# Patient Record
Sex: FEMALE | Race: WHITE | Employment: OTHER | URBAN - METROPOLITAN AREA
[De-identification: names, ages, dates, MRNs, and addresses within clinical notes are randomized per-mention and may not be internally consistent; named-entity substitution may affect disease eponyms.]

---

## 2020-09-12 ENCOUNTER — HOSPITAL ENCOUNTER (EMERGENCY)
Facility: HOSPITAL | Age: 85
Discharge: HOME | End: 2020-09-12
Attending: EMERGENCY MEDICINE
Payer: MEDICARE

## 2020-09-12 VITALS
DIASTOLIC BLOOD PRESSURE: 76 MMHG | HEIGHT: 60 IN | RESPIRATION RATE: 16 BRPM | HEART RATE: 62 BPM | OXYGEN SATURATION: 98 % | WEIGHT: 180 LBS | TEMPERATURE: 98 F | SYSTOLIC BLOOD PRESSURE: 137 MMHG | BODY MASS INDEX: 35.34 KG/M2

## 2020-09-12 DIAGNOSIS — S90.424A BLISTER OF TOE OF RIGHT FOOT WITHOUT INFECTION, INITIAL ENCOUNTER: Primary | ICD-10-CM

## 2020-09-12 PROCEDURE — 99283 EMERGENCY DEPT VISIT LOW MDM: CPT

## 2020-09-12 NOTE — ED INITIAL ASSESSMENT (HPI)
Pt came in for sore to second toe of the right foot that pt's daughter noticed today. Pt is concerned because she has history of Diabetes. Denies fever.

## 2020-09-13 NOTE — ED PROVIDER NOTES
Patient Seen in: [facility] Emergency Department    History   Patient presents with:  Musculoskeletal Problem      HPI    Patient presents to the ED with family concerned about a sore to her right second toe.   History of diabetes and has mild neur Constitutional: She is oriented to person, place, and time. She appears well-developed and well-nourished. No distress. HENT:   Head: Normocephalic and atraumatic. Cardiovascular: Normal rate and intact distal pulses.    Pulmonary/Chest: Effort normal Clinical Impression:  Blister of toe of right foot without infection, initial encounter  (primary encounter diagnosis)    Disposition:  Discharge    Follow-up:  [facility] Emergency Department  [address]   [address]

## 2023-02-21 ENCOUNTER — APPOINTMENT (OUTPATIENT)
Dept: GENERAL RADIOLOGY | Facility: HOSPITAL | Age: 88
End: 2023-02-21
Attending: EMERGENCY MEDICINE
Payer: MEDICARE

## 2023-02-21 ENCOUNTER — APPOINTMENT (OUTPATIENT)
Dept: MRI IMAGING | Facility: HOSPITAL | Age: 88
End: 2023-02-21
Attending: EMERGENCY MEDICINE
Payer: MEDICARE

## 2023-02-21 ENCOUNTER — APPOINTMENT (OUTPATIENT)
Dept: CT IMAGING | Facility: HOSPITAL | Age: 88
End: 2023-02-21
Attending: EMERGENCY MEDICINE
Payer: MEDICARE

## 2023-02-21 ENCOUNTER — APPOINTMENT (OUTPATIENT)
Dept: ULTRASOUND IMAGING | Facility: HOSPITAL | Age: 88
End: 2023-02-21
Attending: HOSPITALIST
Payer: MEDICARE

## 2023-02-21 ENCOUNTER — HOSPITAL ENCOUNTER (OUTPATIENT)
Facility: HOSPITAL | Age: 88
Setting detail: OBSERVATION
Discharge: HOME HEALTH | End: 2023-02-23
Attending: EMERGENCY MEDICINE | Admitting: HOSPITALIST
Payer: MEDICARE

## 2023-02-21 ENCOUNTER — APPOINTMENT (OUTPATIENT)
Dept: CV DIAGNOSTICS | Facility: HOSPITAL | Age: 88
End: 2023-02-21
Attending: INTERNAL MEDICINE
Payer: MEDICARE

## 2023-02-21 DIAGNOSIS — R55 SYNCOPE AND COLLAPSE: Primary | ICD-10-CM

## 2023-02-21 DIAGNOSIS — R42 VERTIGO: ICD-10-CM

## 2023-02-21 DIAGNOSIS — S09.90XA INJURY OF HEAD, INITIAL ENCOUNTER: ICD-10-CM

## 2023-02-21 PROBLEM — E87.6 HYPOKALEMIA: Status: ACTIVE | Noted: 2023-02-21

## 2023-02-21 PROBLEM — R73.9 HYPERGLYCEMIA: Status: ACTIVE | Noted: 2023-02-21

## 2023-02-21 LAB
ANION GAP SERPL CALC-SCNC: 8 MMOL/L (ref 0–18)
ATRIAL RATE: 61 BPM
BASOPHILS # BLD AUTO: 0.09 X10(3) UL (ref 0–0.2)
BASOPHILS NFR BLD AUTO: 0.9 %
BILIRUB UR QL: NEGATIVE
BUN BLD-MCNC: 17 MG/DL (ref 7–18)
BUN/CREAT SERPL: 18.9 (ref 10–20)
CALCIUM BLD-MCNC: 9.6 MG/DL (ref 8.5–10.1)
CHLORIDE SERPL-SCNC: 105 MMOL/L (ref 98–112)
CLARITY UR: CLEAR
CO2 SERPL-SCNC: 30 MMOL/L (ref 21–32)
COLOR UR: YELLOW
CREAT BLD-MCNC: 0.9 MG/DL
DEPRECATED RDW RBC AUTO: 43.6 FL (ref 35.1–46.3)
EOSINOPHIL # BLD AUTO: 0.32 X10(3) UL (ref 0–0.7)
EOSINOPHIL NFR BLD AUTO: 3.1 %
ERYTHROCYTE [DISTWIDTH] IN BLOOD BY AUTOMATED COUNT: 13.3 % (ref 11–15)
EST. AVERAGE GLUCOSE BLD GHB EST-MCNC: 126 MG/DL (ref 68–126)
GFR SERPLBLD BASED ON 1.73 SQ M-ARVRAT: 61 ML/MIN/1.73M2 (ref 60–?)
GLUCOSE BLD-MCNC: 124 MG/DL (ref 70–99)
GLUCOSE BLDC GLUCOMTR-MCNC: 124 MG/DL (ref 70–99)
GLUCOSE BLDC GLUCOMTR-MCNC: 126 MG/DL (ref 70–99)
GLUCOSE BLDC GLUCOMTR-MCNC: 163 MG/DL (ref 70–99)
GLUCOSE BLDC GLUCOMTR-MCNC: 185 MG/DL (ref 70–99)
GLUCOSE BLDC GLUCOMTR-MCNC: 190 MG/DL (ref 70–99)
GLUCOSE UR-MCNC: NEGATIVE MG/DL
HBA1C MFR BLD: 6 % (ref ?–5.7)
HCT VFR BLD AUTO: 41.3 %
HGB BLD-MCNC: 13.4 G/DL
HGB UR QL STRIP.AUTO: NEGATIVE
IMM GRANULOCYTES # BLD AUTO: 0.05 X10(3) UL (ref 0–1)
IMM GRANULOCYTES NFR BLD: 0.5 %
KETONES UR-MCNC: NEGATIVE MG/DL
LEUKOCYTE ESTERASE UR QL STRIP.AUTO: NEGATIVE
LYMPHOCYTES # BLD AUTO: 2.98 X10(3) UL (ref 1–4)
LYMPHOCYTES NFR BLD AUTO: 29.1 %
MCH RBC QN AUTO: 29.2 PG (ref 26–34)
MCHC RBC AUTO-ENTMCNC: 32.4 G/DL (ref 31–37)
MCV RBC AUTO: 90 FL
MONOCYTES # BLD AUTO: 0.77 X10(3) UL (ref 0.1–1)
MONOCYTES NFR BLD AUTO: 7.5 %
NEUTROPHILS # BLD AUTO: 6.03 X10 (3) UL (ref 1.5–7.7)
NEUTROPHILS # BLD AUTO: 6.03 X10(3) UL (ref 1.5–7.7)
NEUTROPHILS NFR BLD AUTO: 58.9 %
NITRITE UR QL STRIP.AUTO: NEGATIVE
OSMOLALITY SERPL CALC.SUM OF ELEC: 299 MOSM/KG (ref 275–295)
P AXIS: 64 DEGREES
P-R INTERVAL: 170 MS
PH UR: 6 [PH] (ref 5–8)
PLATELET # BLD AUTO: 235 10(3)UL (ref 150–450)
POTASSIUM SERPL-SCNC: 3.4 MMOL/L (ref 3.5–5.1)
PROT UR-MCNC: NEGATIVE MG/DL
Q-T INTERVAL: 428 MS
QRS DURATION: 92 MS
QTC CALCULATION (BEZET): 430 MS
R AXIS: -33 DEGREES
RBC # BLD AUTO: 4.59 X10(6)UL
SARS-COV-2 RNA RESP QL NAA+PROBE: NOT DETECTED
SODIUM SERPL-SCNC: 143 MMOL/L (ref 136–145)
SP GR UR STRIP: 1.01 (ref 1–1.03)
T AXIS: 42 DEGREES
TROPONIN I HIGH SENSITIVITY: 10 NG/L
UROBILINOGEN UR STRIP-ACNC: <2
VENTRICULAR RATE: 61 BPM
VIT C UR-MCNC: NEGATIVE MG/DL
WBC # BLD AUTO: 10.2 X10(3) UL (ref 4–11)

## 2023-02-21 PROCEDURE — 93306 TTE W/DOPPLER COMPLETE: CPT | Performed by: INTERNAL MEDICINE

## 2023-02-21 PROCEDURE — 70551 MRI BRAIN STEM W/O DYE: CPT | Performed by: EMERGENCY MEDICINE

## 2023-02-21 PROCEDURE — 70450 CT HEAD/BRAIN W/O DYE: CPT | Performed by: EMERGENCY MEDICINE

## 2023-02-21 PROCEDURE — 73502 X-RAY EXAM HIP UNI 2-3 VIEWS: CPT | Performed by: EMERGENCY MEDICINE

## 2023-02-21 PROCEDURE — 93880 EXTRACRANIAL BILAT STUDY: CPT | Performed by: HOSPITALIST

## 2023-02-21 PROCEDURE — 99223 1ST HOSP IP/OBS HIGH 75: CPT | Performed by: HOSPITALIST

## 2023-02-21 RX ORDER — METOCLOPRAMIDE HYDROCHLORIDE 5 MG/ML
5 INJECTION INTRAMUSCULAR; INTRAVENOUS EVERY 8 HOURS PRN
Status: DISCONTINUED | OUTPATIENT
Start: 2023-02-21 | End: 2023-02-23

## 2023-02-21 RX ORDER — MECLIZINE HCL 12.5 MG/1
12.5 TABLET ORAL 3 TIMES DAILY PRN
Status: DISCONTINUED | OUTPATIENT
Start: 2023-02-21 | End: 2023-02-23

## 2023-02-21 RX ORDER — SODIUM CHLORIDE 9 MG/ML
INJECTION, SOLUTION INTRAVENOUS CONTINUOUS
Status: DISCONTINUED | OUTPATIENT
Start: 2023-02-21 | End: 2023-02-23

## 2023-02-21 RX ORDER — MECLIZINE HYDROCHLORIDE 25 MG/1
25 TABLET ORAL ONCE
Status: COMPLETED | OUTPATIENT
Start: 2023-02-21 | End: 2023-02-21

## 2023-02-21 RX ORDER — POTASSIUM CHLORIDE 20 MEQ/1
40 TABLET, EXTENDED RELEASE ORAL ONCE
Status: COMPLETED | OUTPATIENT
Start: 2023-02-21 | End: 2023-02-21

## 2023-02-21 RX ORDER — LORAZEPAM 2 MG/ML
INJECTION INTRAMUSCULAR
Status: DISPENSED
Start: 2023-02-21 | End: 2023-02-22

## 2023-02-21 RX ORDER — HYDROCHLOROTHIAZIDE 25 MG/1
25 TABLET ORAL DAILY
COMMUNITY
End: 2023-02-23

## 2023-02-21 RX ORDER — ATORVASTATIN CALCIUM 40 MG/1
40 TABLET, FILM COATED ORAL NIGHTLY
Status: DISCONTINUED | OUTPATIENT
Start: 2023-02-21 | End: 2023-02-23

## 2023-02-21 RX ORDER — ATORVASTATIN CALCIUM 40 MG/1
40 TABLET, FILM COATED ORAL NIGHTLY
COMMUNITY

## 2023-02-21 RX ORDER — ASPIRIN 81 MG/1
81 TABLET ORAL DAILY
Status: DISCONTINUED | OUTPATIENT
Start: 2023-02-21 | End: 2023-02-23

## 2023-02-21 RX ORDER — ONDANSETRON 2 MG/ML
4 INJECTION INTRAMUSCULAR; INTRAVENOUS EVERY 6 HOURS PRN
Status: DISCONTINUED | OUTPATIENT
Start: 2023-02-21 | End: 2023-02-23

## 2023-02-21 RX ORDER — ASPIRIN 81 MG/1
81 TABLET ORAL DAILY
COMMUNITY
End: 2023-02-23

## 2023-02-21 RX ORDER — NICOTINE POLACRILEX 4 MG
15 LOZENGE BUCCAL
Status: DISCONTINUED | OUTPATIENT
Start: 2023-02-21 | End: 2023-02-23

## 2023-02-21 RX ORDER — DEXTROSE MONOHYDRATE 25 G/50ML
50 INJECTION, SOLUTION INTRAVENOUS
Status: DISCONTINUED | OUTPATIENT
Start: 2023-02-21 | End: 2023-02-23

## 2023-02-21 RX ORDER — METOPROLOL SUCCINATE 50 MG/1
50 TABLET, EXTENDED RELEASE ORAL
Status: DISCONTINUED | OUTPATIENT
Start: 2023-02-22 | End: 2023-02-23

## 2023-02-21 RX ORDER — METOPROLOL SUCCINATE 100 MG/1
100 TABLET, EXTENDED RELEASE ORAL DAILY
COMMUNITY
End: 2023-02-23

## 2023-02-21 RX ORDER — POTASSIUM CITRATE 10 MEQ/1
10 TABLET, EXTENDED RELEASE ORAL DAILY
COMMUNITY
End: 2023-02-23

## 2023-02-21 RX ORDER — ACETAMINOPHEN 500 MG
500 TABLET ORAL EVERY 4 HOURS PRN
Status: DISCONTINUED | OUTPATIENT
Start: 2023-02-21 | End: 2023-02-23

## 2023-02-21 RX ORDER — NICOTINE POLACRILEX 4 MG
30 LOZENGE BUCCAL
Status: DISCONTINUED | OUTPATIENT
Start: 2023-02-21 | End: 2023-02-23

## 2023-02-21 NOTE — CM/SW NOTE
02/21/23 1400   CM/SW Referral Data   Referral Source Physician   Reason for Referral Discharge planning   Informant Patient   Access to Care / Medical Hx   Do you have a doctor or clinic where you usually go for medical care (including prenatal care)? Yes  (Dr. Mickey Mullen)   Patient Info   Advanced directives? Yes  (Dtr Riddhi)   Patient's Current Mental Status at Time of Assessment Alert;Oriented   Patient's [address]   Number of Levels in Home 2  (Pt's bedroom on the 1st level)   Number of Stair in Home 1 external step   Patient lives with Daughter  Carter Cee)   Patient Status Prior to Admission   Independent with ADLs and Mobility No   Pt. requires assistance with Ambulating;Dressing; Toileting   Services in place prior to admission [address] [facility];DME/Supplies at home   [address] [place] [address]   [facility] D[facility]Supplies [facility]; [address] [facility]   Discharge Needs   Anticipated D/C needs Home health care   Choice of Post-Acute Provider   Informed patient of right to choose their preferred provider Yes   SW intern met with pt and pt's dtr's at bedside to complete above assessment. Pt confirmed address on face sheet as correct. Pt's dtr Riddhi stated that she helps the pt dressing. Pt stated that she walks with a walker at baseline. Pt and pt's dtr confirmed to be current with [facility] services. Pt's dtr Riddhi stated that the [facility] agency is [employer]. Pt receives RN once a week and had PT 2 times a week then once a week and then it was completed. Pt does not drive dtr Riddhi stated that the pt is legally blind. SW intern left a message on [employer] voicemail at [phone] to let her know that the POA paperwork is not on file. Pt denied wearing O2 at home. Pt denied dialysis. Pt is agreeable to [facility]. PLAN: Home with Joe Powell. Pending PT/OT rec? Need ZULY.     Social Work Intern  [employer]

## 2023-02-21 NOTE — H&P
[facility]    PATIENT'S NAME: Maite Irene PHYSICIAN: Lorie Groves MD   PATIENT ACCOUNT#:   [id number]    LOCATION:  1W EDOF54 [facility]  MEDICAL RECORD #:   [id number]       YOB: 1932  ADMISSION DATE:       02/21/2023    HISTORY AND PHYSICAL EXAMINATION    CHIEF COMPLAINT:  Syncope and presyncope. HISTORY OF PRESENT ILLNESS:  The patient is a 80-year-old  female who was brought in today after she had multiple episodes at home of presyncope to syncopal episodes in the last 10 days. CBC and chemistry were unremarkable. Potassium 3.4. EKG showed sinus rhythm, rate mid 60s. MRI scan of the brain and x-ray of left hip were unremarkable. The patient will be admitted to the hospital for further observation. PAST MEDICAL HISTORY:  Paroxysmal atrial fibrillation diagnosed December 2022 currently anticoagulated with Eliquis, hypertension, hyperlipidemia, cerebrovascular accident, diabetes mellitus type 2 non-insulin-dependent. Echocardiogram done in December 2022 showed left ventricular diastolic dysfunction, severely dilated left atrium. PAST SURGICAL HISTORY:  Bilateral total hip arthroplasties and remote cholecystectomy. MEDICATIONS:  Please see medication reconciliation list.    ALLERGIES:  Latex and sulfa antibiotics. FAMILY HISTORY:  Positive for hypertension and diabetes. REVIEW OF SYSTEMS:  The patient said 2 days ago she was standing in the bathroom using her walker when her left leg started wiggling and felt very weak and she collapsed to the floor. Did not hit her head. Another episode 1 week prior to that when she briefly lost consciousness while standing in an upright position. She usually feels very lightheaded when she stands up quickly. She had poor appetite and she had lost around 10 pounds in the last 2 weeks. The patient was placed back on hydrochlorothiazide by a home visiting physician because of crackles and rhonchi in her lungs. At that time she was having cough and viral infection. Since then, she has been feeling more lightheaded when she stands up. She was taken off hydrochlorothiazide in December 2022 during hospitalization for new diagnosis of atrial fibrillation. After that, she was on a cardiac monitor for 16 days which showed no incidence of syncope or presyncope. Her hydrochlorothiazide was discontinued during that hospitalization. Other 12-point review of systems negative. No chest pain, no cough, no abdominal pain, no diarrhea. PHYSICAL EXAMINATION:    GENERAL:  Alert. Oriented to time, place, and person. No acute distress. VITAL SIGNS:  Temperature 97.3, pulse 71, respiratory rate 19, blood pressure 111/69, pulse ox 97% on room air. HEENT:  Atraumatic. Oropharynx clear, dry mucous membranes. Normal hard and soft palate. Eyes:  Anicteric sclerae. NECK:  Supple. No lymphadenopathy. Trachea midline. Full range of motion. LUNGS:  Clear to auscultation bilaterally. Normal respiratory effort. HEART:  Regular rate and rhythm. S1, S2 auscultated. No murmur. ABDOMEN:  Soft, nondistended, no tenderness. Positive bowel sounds. EXTREMITIES:  No peripheral edema, clubbing, or cyanosis. NEUROLOGIC:  Motor and sensory intact. ASSESSMENT:    1. Syncope and presyncope, most likely orthostatic in nature. 2.   History of atrial fibrillation. 3.   Diabetes mellitus type 2. PLAN:  The patient will be admitted to telemetry floor for observation. Obtain carotid ultrasound. She did have a recent echocardiogram done noted on record. Obtain cardiology consult, obtain orthostatic measurements, discontinue hydrochlorothiazide, gently hydrate, place her on fall precautions and physical therapy. Further recommendations to follow.     Dictated By Mai Prater MD  d: 02/21/2023 12:34:54  t: 02/21/2023 12:40:16  Job [id number]  FB/

## 2023-02-21 NOTE — PLAN OF CARE
RA. Afebrile. Patient still complaining of dizziness at times. Bedrest maintained. Zoey Mule in place with good output. US carotid negative. ECHO to be completed. Orthostatics qshift. Daughter updated at bedside. Problem: CARDIOVASCULAR - ADULT  Goal: Maintains optimal cardiac output and hemodynamic stability  Description: INTERVENTIONS:  - Monitor vital signs, rhythm, and trends  - Monitor for bleeding, hypotension and signs of decreased cardiac output  - Evaluate effectiveness of vasoactive medications to optimize hemodynamic stability  - Monitor arterial and/or venous puncture sites for bleeding and/or hematoma  - Assess quality of pulses, skin color and temperature  - Assess for signs of decreased coronary artery perfusion - ex.  Angina  - Evaluate fluid balance, assess for edema, trend weights  Outcome: Progressing  Goal: Absence of cardiac arrhythmias or at baseline  Description: INTERVENTIONS:  - Continuous cardiac monitoring, monitor vital signs, obtain 12 lead EKG if indicated  - Evaluate effectiveness of antiarrhythmic and heart rate control medications as ordered  - Initiate emergency measures for life threatening arrhythmias  - Monitor electrolytes and administer replacement therapy as ordered  Outcome: Progressing

## 2023-02-21 NOTE — ED INITIAL ASSESSMENT (HPI)
Roberto Coffee on Sunday d/t \"knee giving out\" and hit head on door. No LOC. On blood thinners. Today woke up and sat up from laying down she felt lightheaded and dizzy. Currently says she only feels lightheaded when she moved her head. No other complaints.

## 2023-02-21 NOTE — PROGRESS NOTES
This RN called report to Rafaela Tuttle RN. Daughter aware and agreeable of transfer. Skin intact with no wounds.

## 2023-02-22 LAB
ANION GAP SERPL CALC-SCNC: 7 MMOL/L (ref 0–18)
BASOPHILS # BLD AUTO: 0.06 X10(3) UL (ref 0–0.2)
BASOPHILS NFR BLD AUTO: 0.6 %
BUN BLD-MCNC: 16 MG/DL (ref 7–18)
BUN/CREAT SERPL: 18 (ref 10–20)
CALCIUM BLD-MCNC: 8.9 MG/DL (ref 8.5–10.1)
CHLORIDE SERPL-SCNC: 111 MMOL/L (ref 98–112)
CO2 SERPL-SCNC: 26 MMOL/L (ref 21–32)
CREAT BLD-MCNC: 0.89 MG/DL
DEPRECATED RDW RBC AUTO: 42.7 FL (ref 35.1–46.3)
EOSINOPHIL # BLD AUTO: 0.24 X10(3) UL (ref 0–0.7)
EOSINOPHIL NFR BLD AUTO: 2.6 %
ERYTHROCYTE [DISTWIDTH] IN BLOOD BY AUTOMATED COUNT: 13.1 % (ref 11–15)
GFR SERPLBLD BASED ON 1.73 SQ M-ARVRAT: 62 ML/MIN/1.73M2 (ref 60–?)
GLUCOSE BLD-MCNC: 134 MG/DL (ref 70–99)
GLUCOSE BLDC GLUCOMTR-MCNC: 122 MG/DL (ref 70–99)
GLUCOSE BLDC GLUCOMTR-MCNC: 142 MG/DL (ref 70–99)
GLUCOSE BLDC GLUCOMTR-MCNC: 159 MG/DL (ref 70–99)
GLUCOSE BLDC GLUCOMTR-MCNC: 89 MG/DL (ref 70–99)
HCT VFR BLD AUTO: 36.3 %
HGB BLD-MCNC: 11.9 G/DL
IMM GRANULOCYTES # BLD AUTO: 0.03 X10(3) UL (ref 0–1)
IMM GRANULOCYTES NFR BLD: 0.3 %
LYMPHOCYTES # BLD AUTO: 2.13 X10(3) UL (ref 1–4)
LYMPHOCYTES NFR BLD AUTO: 23 %
MCH RBC QN AUTO: 29.6 PG (ref 26–34)
MCHC RBC AUTO-ENTMCNC: 32.8 G/DL (ref 31–37)
MCV RBC AUTO: 90.3 FL
MONOCYTES # BLD AUTO: 0.7 X10(3) UL (ref 0.1–1)
MONOCYTES NFR BLD AUTO: 7.6 %
NEUTROPHILS # BLD AUTO: 6.1 X10 (3) UL (ref 1.5–7.7)
NEUTROPHILS # BLD AUTO: 6.1 X10(3) UL (ref 1.5–7.7)
NEUTROPHILS NFR BLD AUTO: 65.9 %
OSMOLALITY SERPL CALC.SUM OF ELEC: 301 MOSM/KG (ref 275–295)
PLATELET # BLD AUTO: 198 10(3)UL (ref 150–450)
POTASSIUM SERPL-SCNC: 3.7 MMOL/L (ref 3.5–5.1)
POTASSIUM SERPL-SCNC: 3.7 MMOL/L (ref 3.5–5.1)
RBC # BLD AUTO: 4.02 X10(6)UL
SODIUM SERPL-SCNC: 144 MMOL/L (ref 136–145)
WBC # BLD AUTO: 9.3 X10(3) UL (ref 4–11)

## 2023-02-22 PROCEDURE — 99233 SBSQ HOSP IP/OBS HIGH 50: CPT | Performed by: HOSPITALIST

## 2023-02-22 RX ORDER — AMOXICILLIN AND CLAVULANATE POTASSIUM 875; 125 MG/1; MG/1
875 TABLET, FILM COATED ORAL EVERY 12 HOURS SCHEDULED
Status: DISCONTINUED | OUTPATIENT
Start: 2023-02-22 | End: 2023-02-23

## 2023-02-22 RX ORDER — ECHINACEA PURPUREA EXTRACT 125 MG
1 TABLET ORAL EVERY 6 HOURS
Status: DISCONTINUED | OUTPATIENT
Start: 2023-02-22 | End: 2023-02-23

## 2023-02-22 NOTE — PLAN OF CARE
Problem: Patient Centered Care  Goal: Patient preferences are identified and integrated in the patient's plan of care  Description: Interventions:  - What would you like us to know as we care for you? Patient is from home with daughter   - Provide timely, complete, and accurate information to patient/family  - Incorporate patient and family knowledge, values, beliefs, and cultural backgrounds into the planning and delivery of care  - Encourage patient/family to participate in care and decision-making at the level they choose  - Honor patient and family perspectives and choices  Outcome: Progressing     Problem: Patient/Family Goals  Goal: Patient/Family Long Term Goal  Description: Patient's Long Term Goal: to go home    Interventions:  - See additional Care Plan goals for specific interventions  Outcome: Progressing  Goal: Patient/Family Short Term Goal  Description: Patient's Short Term Goal: to get rid of this vertigo     Interventions:   - See additional Care Plan goals for specific interventions  Outcome: Progressing     Problem: CARDIOVASCULAR - ADULT  Goal: Maintains optimal cardiac output and hemodynamic stability  Description: INTERVENTIONS:  - Monitor vital signs, rhythm, and trends  - Monitor for bleeding, hypotension and signs of decreased cardiac output  - Evaluate effectiveness of vasoactive medications to optimize hemodynamic stability  - Monitor arterial and/or venous puncture sites for bleeding and/or hematoma  - Assess quality of pulses, skin color and temperature  - Assess for signs of decreased coronary artery perfusion - ex.  Angina  - Evaluate fluid balance, assess for edema, trend weights  Outcome: Progressing  Goal: Absence of cardiac arrhythmias or at baseline  Description: INTERVENTIONS:  - Continuous cardiac monitoring, monitor vital signs, obtain 12 lead EKG if indicated  - Evaluate effectiveness of antiarrhythmic and heart rate control medications as ordered  - Initiate emergency measures for life threatening arrhythmias  - Monitor electrolytes and administer replacement therapy as ordered  Outcome: Progressing   Patient states her vertigo is improved. VSS. Orthostatics q shift. Echo done yesterday, results pending. Fall precautions in place. Bed in lowest locked position with side rails up and call light within reach. Will continue to monitor patient.

## 2023-02-22 NOTE — CONSULTS
[facility]    PATIENT'S NAME: Gertrudis Zarco   ATTENDING PHYSICIAN: Netta Monroy DO   CONSULTING PHYSICIAN: Roberto Harrison DO   PATIENT ACCOUNT#:   [de-identified]    LOCATION:  [address] RECORD #:   [id number]       YOB: 1932  ADMISSION DATE:       02/21/2023      CONSULT DATE:  02/22/2023    REPORT OF CONSULTATION      HISTORY OF PRESENT ILLNESS:  This is a 59-year-old female who comes in with dizziness and inability to stand. No chest pain or heaviness in the chest at rest or with activity. She had multiple episodes of presyncope. No issues with telemetry overnight. She had MRI of the brain and x-ray of the left hip which were unremarkable. She states that she feels orthostatic. PAST MEDICAL HISTORY:  Significant for paroxysmal atrial fibrillation, hypertension, hyperlipidemia, CVA, type 2 diabetes, severely dilated left atrium by echocardiogram.    MEDICATIONS:  Medication list reviewed. ALLERGIES:  Latex and sulfa. SOCIAL HISTORY:  Nonsmoker. No alcohol use. REVIEW OF SYSTEMS:  Otherwise negative. As stated, the patient did complain of orthostatic symptoms. The patient has been stopped on hydrochlorothiazide. PHYSICAL EXAMINATION:    GENERAL:  Obese, no acute distress. VITAL SIGNS:  Blood pressure 147/93, pulse 76. HEENT:  Head is atraumatic. No scleral icterus. Oral mucosa is moist.  NECK:  Neck veins are not seen. No carotid bruits. LUNGS:  Clear. HEART:  S1, S2.  Regular. No murmurs. ABDOMEN:  Soft, nontender. EXTREMITIES:  Without edema. NEUROLOGIC:  Alert, appropriate. PSYCHIATRIC:  The patient has calm affect and appropriate mood. IMPRESSION:    1. Syncope and presyncope. Appears to be orthostatic by symptoms. 2.   Paroxysmal atrial fibrillation. Currently in sinus rhythm overnight on the monitor. No blocks in the monitor as well. EKG demonstrates sinus rhythm with left axis deviation.     RECOMMENDATIONS:  Orthostatic vitals to be obtained today. The patient had gentle hydration overnight. Continue with Eliquis, metoprolol. Hydrochlorothiazide has been stopped. Thank you for the consult.     Dictated By Jamel Collazo DO  d: 02/22/2023 09:25:18  t: 02/22/2023 09:30:01  [facility] [id number]  AS/

## 2023-02-22 NOTE — PLAN OF CARE
Problem: CARDIOVASCULAR - ADULT  Goal: Maintains optimal cardiac output and hemodynamic stability  Description: INTERVENTIONS:  - Monitor vital signs, rhythm, and trends  - Monitor for bleeding, hypotension and signs of decreased cardiac output  - Evaluate effectiveness of vasoactive medications to optimize hemodynamic stability  - Monitor arterial and/or venous puncture sites for bleeding and/or hematoma  - Assess quality of pulses, skin color and temperature  - Assess for signs of decreased coronary artery perfusion - ex. Angina  - Evaluate fluid balance, assess for edema, trend weights  Outcome: Progressing  Goal: Absence of cardiac arrhythmias or at baseline  Description: INTERVENTIONS:  - Continuous cardiac monitoring, monitor vital signs, obtain 12 lead EKG if indicated  - Evaluate effectiveness of antiarrhythmic and heart rate control medications as ordered  - Initiate emergency measures for life threatening arrhythmias  - Monitor electrolytes and administer replacement therapy as ordered  Outcome: Progressing   No complaint of chest pain or shortness of breath. Noted to have dizziness with movements, patient stated \" not that much \". Alert and oriented. VSS. Oriented to room and call light. Fall risk. Bed on low and locked position, call light within reach.

## 2023-02-22 NOTE — PROGRESS NOTES
02/22/23 1243   Clinical Encounter Type   Visited With Patient and family together   Routine Visit Introduction   Continue Visiting Yes   Family Spiritual Encounters   Family Participation in [address] During Treatment Consistently   Taxonomy   Intended Effects Angy affirmation   Methods Offer spiritual/Congregational support   Interventions Prayer for healing       Pt desired [place] daughter was at the bedside.  was able to pray for the Pt and her daughter and informed them of  availability.      Christopher Reveles, Chaplain Resident

## 2023-02-23 VITALS
WEIGHT: 168 LBS | OXYGEN SATURATION: 96 % | HEART RATE: 78 BPM | HEIGHT: 61 IN | DIASTOLIC BLOOD PRESSURE: 88 MMHG | SYSTOLIC BLOOD PRESSURE: 156 MMHG | TEMPERATURE: 99 F | BODY MASS INDEX: 31.72 KG/M2 | RESPIRATION RATE: 20 BRPM

## 2023-02-23 LAB
ANION GAP SERPL CALC-SCNC: 5 MMOL/L (ref 0–18)
BUN BLD-MCNC: 13 MG/DL (ref 7–18)
BUN/CREAT SERPL: 17.3 (ref 10–20)
CALCIUM BLD-MCNC: 8.5 MG/DL (ref 8.5–10.1)
CHLORIDE SERPL-SCNC: 114 MMOL/L (ref 98–112)
CO2 SERPL-SCNC: 25 MMOL/L (ref 21–32)
CREAT BLD-MCNC: 0.75 MG/DL
DEPRECATED RDW RBC AUTO: 44.4 FL (ref 35.1–46.3)
ERYTHROCYTE [DISTWIDTH] IN BLOOD BY AUTOMATED COUNT: 13.2 % (ref 11–15)
GFR SERPLBLD BASED ON 1.73 SQ M-ARVRAT: 76 ML/MIN/1.73M2 (ref 60–?)
GLUCOSE BLD-MCNC: 119 MG/DL (ref 70–99)
GLUCOSE BLDC GLUCOMTR-MCNC: 110 MG/DL (ref 70–99)
GLUCOSE BLDC GLUCOMTR-MCNC: 142 MG/DL (ref 70–99)
GLUCOSE BLDC GLUCOMTR-MCNC: 221 MG/DL (ref 70–99)
HCT VFR BLD AUTO: 33 %
HGB BLD-MCNC: 10.7 G/DL
MCH RBC QN AUTO: 29.6 PG (ref 26–34)
MCHC RBC AUTO-ENTMCNC: 32.4 G/DL (ref 31–37)
MCV RBC AUTO: 91.4 FL
OSMOLALITY SERPL CALC.SUM OF ELEC: 299 MOSM/KG (ref 275–295)
PLATELET # BLD AUTO: 173 10(3)UL (ref 150–450)
POTASSIUM SERPL-SCNC: 3.5 MMOL/L (ref 3.5–5.1)
RBC # BLD AUTO: 3.61 X10(6)UL
SODIUM SERPL-SCNC: 144 MMOL/L (ref 136–145)
WBC # BLD AUTO: 7.6 X10(3) UL (ref 4–11)

## 2023-02-23 PROCEDURE — 99239 HOSP IP/OBS DSCHRG MGMT >30: CPT | Performed by: HOSPITALIST

## 2023-02-23 RX ORDER — MECLIZINE HCL 12.5 MG/1
12.5 TABLET ORAL 3 TIMES DAILY PRN
Qty: 20 TABLET | Refills: 0 | Status: SHIPPED | OUTPATIENT
Start: 2023-02-23

## 2023-02-23 RX ORDER — METOPROLOL SUCCINATE 50 MG/1
50 TABLET, EXTENDED RELEASE ORAL
Qty: 30 TABLET | Refills: 0 | Status: SHIPPED | OUTPATIENT
Start: 2023-02-24

## 2023-02-23 RX ORDER — AMOXICILLIN AND CLAVULANATE POTASSIUM 875; 125 MG/1; MG/1
875 TABLET, FILM COATED ORAL EVERY 12 HOURS SCHEDULED
Qty: 18 TABLET | Refills: 0 | Status: SHIPPED | OUTPATIENT
Start: 2023-02-23

## 2023-02-23 NOTE — CM/SW NOTE
02/23/23 1200   Discharge disposition   Expected discharge disposition Home-Health   Post Acute Care Provider   (Mj Patel)   Discharge transportation Private car     SW informed [facility] of pt discharge and requested follow up with pt in the community for [facility].     MICHELLE Cuadra, [facility]  Social Work   SWW:#[phone]

## 2023-02-23 NOTE — PLAN OF CARE
Problem: Patient Centered Care  Goal: Patient preferences are identified and integrated in the patient's plan of care  Description: Interventions:  - What would you like us to know as we care for you? Patient is from home with daughter   - Provide timely, complete, and accurate information to patient/family  - Incorporate patient and family knowledge, values, beliefs, and cultural backgrounds into the planning and delivery of care  - Encourage patient/family to participate in care and decision-making at the level they choose  - Honor patient and family perspectives and choices  Outcome: Progressing     Problem: Patient/Family Goals  Goal: Patient/Family Long Term Goal  Description: Patient's Long Term Goal: To go home    Interventions:  - See additional Care Plan goals for specific interventions  Outcome: Progressing  Goal: Patient/Family Short Term Goal  Description: Patient's Short Term Goal: To feel better     Interventions:     - See additional Care Plan goals for specific interventions  Outcome: Progressing     Problem: CARDIOVASCULAR - ADULT  Goal: Maintains optimal cardiac output and hemodynamic stability  Description: INTERVENTIONS:  - Monitor vital signs, rhythm, and trends  - Monitor for bleeding, hypotension and signs of decreased cardiac output  - Evaluate effectiveness of vasoactive medications to optimize hemodynamic stability  - Monitor arterial and/or venous puncture sites for bleeding and/or hematoma  - Assess quality of pulses, skin color and temperature  - Assess for signs of decreased coronary artery perfusion - ex.  Angina  - Evaluate fluid balance, assess for edema, trend weights  Outcome: Progressing  Goal: Absence of cardiac arrhythmias or at baseline  Description: INTERVENTIONS:  - Continuous cardiac monitoring, monitor vital signs, obtain 12 lead EKG if indicated  - Evaluate effectiveness of antiarrhythmic and heart rate control medications as ordered  - Initiate emergency measures for life threatening arrhythmias  - Monitor electrolytes and administer replacement therapy as ordered  Outcome: Progressing     Problem: Diabetes/Glucose Control  Goal: Glucose maintained within prescribed range  Description: INTERVENTIONS:  - Monitor Blood Glucose as ordered  - Assess for signs and symptoms of hyperglycemia and hypoglycemia  - Administer ordered medications to maintain glucose within target range  - Assess barriers to adequate nutritional intake and initiate nutrition consult as needed  - Instruct patient on self management of diabetes  Outcome: Progressing   Pt positive for orthostatic b/p. MRI showed sinusitis, started on Augmentin and saline spray. Patient states dizziness is resolved this morning. NSR on tele. VSS. Call light within reach and bed in low position. Will continue to monitor patient.

## 2023-02-23 NOTE — PLAN OF CARE
Pt positive for orthostatic b/p, dizzy, Antivert made it worse, MRI showed sinusitis, started on Augmentin and saline spray. NSR on tele. Up in chair and family at bedside. Call light within reach and bed in low position. Problem: Patient Centered Care  Goal: Patient preferences are identified and integrated in the patient's plan of care  Description: Interventions:  - What would you like us to know as we care for you?   - Provide timely, complete, and accurate information to patient/family  - Incorporate patient and family knowledge, values, beliefs, and cultural backgrounds into the planning and delivery of care  - Encourage patient/family to participate in care and decision-making at the level they choose  - Honor patient and family perspectives and choices  Outcome: Progressing     Problem: Patient/Family Goals  Goal: Patient/Family Long Term Goal  Description: Patient's Long Term Goal:     Interventions:  -   - See additional Care Plan goals for specific interventions  Outcome: Progressing  Goal: Patient/Family Short Term Goal  Description: Patient's Short Term Goal:     Interventions:   -   - See additional Care Plan goals for specific interventions  Outcome: Progressing     Problem: CARDIOVASCULAR - ADULT  Goal: Maintains optimal cardiac output and hemodynamic stability  Description: INTERVENTIONS:  - Monitor vital signs, rhythm, and trends  - Monitor for bleeding, hypotension and signs of decreased cardiac output  - Evaluate effectiveness of vasoactive medications to optimize hemodynamic stability  - Monitor arterial and/or venous puncture sites for bleeding and/or hematoma  - Assess quality of pulses, skin color and temperature  - Assess for signs of decreased coronary artery perfusion - ex.  Angina  - Evaluate fluid balance, assess for edema, trend weights  Outcome: Progressing  Goal: Absence of cardiac arrhythmias or at baseline  Description: INTERVENTIONS:  - Continuous cardiac monitoring, monitor vital signs, obtain 12 lead EKG if indicated  - Evaluate effectiveness of antiarrhythmic and heart rate control medications as ordered  - Initiate emergency measures for life threatening arrhythmias  - Monitor electrolytes and administer replacement therapy as ordered  Outcome: Progressing     Problem: Diabetes/Glucose Control  Goal: Glucose maintained within prescribed range  Description: INTERVENTIONS:  - Monitor Blood Glucose as ordered  - Assess for signs and symptoms of hyperglycemia and hypoglycemia  - Administer ordered medications to maintain glucose within target range  - Assess barriers to adequate nutritional intake and initiate nutrition consult as needed  - Instruct patient on self management of diabetes  Outcome: Progressing

## 2023-02-23 NOTE — PROGRESS NOTES
Patient seen in follow up. No reported chest pain or sob. No new complaints. Chart reviewed. Review of systems: Constitutional: Negative for chills. Respiratory: Negative for shortness of breath or cough. Cardiac: Negative for chest pain or tightness. Gastrointestinal: Negative for abdominal pain or nausea. Neuro: Negative for headache or numbness. Still feels a little bit dizzy. 02/23/23  0856   BP: 156/88   Pulse: 72   Resp: 20   Temp:        Intake/Output Summary (Last 24 hours) at 2/23/2023 1104  Last data filed at 2/23/2023 1028  Gross per 24 hour   Intake 240 ml   Output 1402 ml   Net -1162 ml     Wt Readings from Last 1 Encounters:  02/21/23 : 167 lb 15.9 oz (76.2 kg)       General: No acute distress. Neck: Jugular venous pulsations not seen. Lungs: Clear to auscultation. Heart: Normal rate. No murmurs. Abdomen: Soft. Non tender  Extremities: No edema. Neurological: Alert. No focal deficits. Psychiatric: Appropriate mood and affect.   amoxicillin clavulanate (Augmentin) 875-125 MG per tab 875 mg, 875 mg, Oral, Q12H  sodium chloride (Saline Mist) 0.65 % nasal solution 1 spray, 1 spray, Each Nare, q6h  sodium chloride 0.9% infusion, , Intravenous, Continuous  acetaminophen (Tylenol Extra Strength) tab 500 mg, 500 mg, Oral, Q4H PRN  ondansetron (Zofran) 4 MG/2ML injection 4 mg, 4 mg, Intravenous, Q6H PRN  metoclopramide (Reglan) 5 mg/mL injection 5 mg, 5 mg, Intravenous, Q8H PRN  glucose (Dex4) 15 GM/59ML oral liquid 15 g, 15 g, Oral, Q15 Min PRN   Or  glucose (Glutose) 40% oral gel 15 g, 15 g, Oral, Q15 Min PRN   Or  glucose-vitamin C (Dex-4) chewable tab 4 tablet, 4 tablet, Oral, Q15 Min PRN   Or  dextrose 50% injection 50 mL, 50 mL, Intravenous, Q15 Min PRN   Or  glucose (Dex4) 15 GM/59ML oral liquid 30 g, 30 g, Oral, Q15 Min PRN   Or  glucose (Glutose) 40% oral gel 30 g, 30 g, Oral, Q15 Min PRN   Or  glucose-vitamin C (Dex-4) chewable tab 8 tablet, 8 tablet, Oral, Q15 Min PRN  insulin aspart (NovoLOG) 100 Units/mL FlexPen 1-5 Units, 1-5 Units, Subcutaneous, TID CC  metoprolol succinate ER (Toprol XL) 24 hr tab 50 mg, 50 mg, Oral, Daily Beta Blocker  meclizine (Antivert) tab 12.5 mg, 12.5 mg, Oral, TID PRN  apixaban (Eliquis) tab 5 mg, 5 mg, Oral, BID  aspirin DR tab 81 mg, 81 mg, Oral, Daily  atorvastatin (Lipitor) tab 40 mg, 40 mg, Oral, Nightly  miconazole 2 % powder, , Topical, BID      apixaban 5 MG Oral Tab, Take 1 tablet (5 mg total) by mouth 2 (two) times daily. aspirin 81 MG Oral Tab EC, Take 1 tablet (81 mg total) by mouth daily. metFORMIN 500 MG Oral Tab, Take 1 tablet (500 mg total) by mouth 2 (two) times daily with meals. metoprolol succinate  MG Oral Tablet 24 Hr, Take 1 tablet (100 mg total) by mouth daily. atorvastatin 40 MG Oral Tab, Take 1 tablet (40 mg total) by mouth nightly. potassium citrate 10 MEQ (1080 MG) Oral Tab CR, Take 1 tablet (10 mEq total) by mouth daily. hydroCHLOROthiazide 25 MG Oral Tab, Take 1 tablet (25 mg total) by mouth daily. US CAROTID DOPPLER BILAT - DIAG IMG ([id number]=07213)    Result Date: 2/21/2023  CONCLUSION: No significant ICA stenosis. Dictated by (CST): Symone Quinn MD on 2/21/2023 at 2:00 PM     Finalized by (CST): Symone Quinn MD on 2/21/2023 at 2:01 PM            Lab Results   Component Value Date    WBC 7.6 02/23/2023    HGB 10.7 02/23/2023    HCT 33.0 02/23/2023    .0 02/23/2023    CREATSERUM 0.75 02/23/2023    BUN 13 02/23/2023     02/23/2023    K 3.5 02/23/2023     02/23/2023    CO2 25.0 02/23/2023     02/23/2023    CA 8.5 02/23/2023       IMPRESSION: 1. Syncope and presyncope. Appears to be orthostatic by symptoms. 2.       Paroxysmal atrial fibrillation. Currently in sinus rhythm overnight on the monitor. No blocks in the monitor as well. EKG demonstrates sinus rhythm with left axis deviation. PLAN:    Orthostatic vitals performed today are negative.   Cardiology wise patient is stable for discharge we will stop hydrochlorothiazide at at the time of discharge.     Roberto Hanson [facility]  [address]  [place]  [phone]

## 2023-02-24 ENCOUNTER — PATIENT OUTREACH (OUTPATIENT)
Dept: CASE MANAGEMENT | Age: 88
End: 2023-02-24

## 2023-02-24 ENCOUNTER — TELEPHONE (OUTPATIENT)
Dept: INTERNAL MEDICINE CLINIC | Facility: CLINIC | Age: 88
End: 2023-02-24

## 2023-02-24 DIAGNOSIS — Z02.9 ENCOUNTERS FOR UNSPECIFIED ADMINISTRATIVE PURPOSE: ICD-10-CM

## 2023-02-24 PROCEDURE — 1111F DSCHRG MED/CURRENT MED MERGE: CPT

## 2023-02-24 NOTE — PROGRESS NOTES
Attempted to reach the patient to complete a [facility] FU call. Left a message for the pt to call the [facility] back at, [phone].

## 2023-02-24 NOTE — PLAN OF CARE
Problem: Patient Centered Care  Goal: Patient preferences are identified and integrated in the patient's plan of care  Description: Interventions:  - What would you like us to know as we care for you? Lives at home with daughter  - Provide timely, complete, and accurate information to patient/family  - Incorporate patient and family knowledge, values, beliefs, and cultural backgrounds into the planning and delivery of care  - Encourage patient/family to participate in care and decision-making at the level they choose  - Honor patient and family perspectives and choices  Outcome: Adequate for Discharge     Problem: Patient/Family Goals  Goal: Patient/Family Long Term Goal  Description: Patient's Long Term Goal: go home    Interventions:  - medications as ordered  - monitor vital signs  - See additional Care Plan goals for specific interventions  Outcome: Adequate for Discharge  Goal: Patient/Family Short Term Goal  Description: Patient's Short Term Goal: go home    Interventions:   - administer medications as ordered  - See additional Care Plan goals for specific interventions  Outcome: Adequate for Discharge     Problem: CARDIOVASCULAR - ADULT  Goal: Maintains optimal cardiac output and hemodynamic stability  Description: INTERVENTIONS:  - Monitor vital signs, rhythm, and trends  - Monitor for bleeding, hypotension and signs of decreased cardiac output  - Evaluate effectiveness of vasoactive medications to optimize hemodynamic stability  - Monitor arterial and/or venous puncture sites for bleeding and/or hematoma  - Assess quality of pulses, skin color and temperature  - Assess for signs of decreased coronary artery perfusion - ex.  Angina  - Evaluate fluid balance, assess for edema, trend weights  Outcome: Adequate for Discharge  Goal: Absence of cardiac arrhythmias or at baseline  Description: INTERVENTIONS:  - Continuous cardiac monitoring, monitor vital signs, obtain 12 lead EKG if indicated  - Evaluate effectiveness of antiarrhythmic and heart rate control medications as ordered  - Initiate emergency measures for life threatening arrhythmias  - Monitor electrolytes and administer replacement therapy as ordered  Outcome: Adequate for Discharge     Problem: Diabetes/Glucose Control  Goal: Glucose maintained within prescribed range  Description: INTERVENTIONS:  - Monitor Blood Glucose as ordered  - Assess for signs and symptoms of hyperglycemia and hypoglycemia  - Administer ordered medications to maintain glucose within target range  - Assess barriers to adequate nutritional intake and initiate nutrition consult as needed  - Instruct patient on self management of diabetes  Outcome: Adequate for Discharge   Patient denies any complaint of pain. Stated \"still with dizziness but better\". Orthostatic BP better. VSS. Blood sugar within range. Seen by attending MD and consulting MD, discharge order received. Saline lock and telemetry discontinued . Discharge instructions given and discussed with patient and daughter. Instructions includes medications to continue taking at home and when to take the next dose. Also made aware of medications that has been discontinued. Daughter is aware about the medications that needed to be picked up at their pharmacy. Daughter verbalized understanding of all instructions. Patient discharged home with daughter and son in law, patient in stable condition upon discharge.

## 2023-03-06 ENCOUNTER — OFFICE VISIT (OUTPATIENT)
Dept: INTERNAL MEDICINE CLINIC | Facility: CLINIC | Age: 88
End: 2023-03-06

## 2023-03-06 VITALS
DIASTOLIC BLOOD PRESSURE: 85 MMHG | HEART RATE: 64 BPM | SYSTOLIC BLOOD PRESSURE: 134 MMHG | WEIGHT: 174 LBS | TEMPERATURE: 97 F | HEIGHT: 61 IN | BODY MASS INDEX: 32.85 KG/M2 | OXYGEN SATURATION: 98 %

## 2023-03-06 DIAGNOSIS — R55 SYNCOPE, UNSPECIFIED SYNCOPE TYPE: Primary | ICD-10-CM

## 2023-03-08 NOTE — PROGRESS NOTES
Multiple attempts to reach pt and messages left with no return call. Patient went in for HFU appt with PCP on 3/6/23. Encounter closing.

## 2023-03-23 ENCOUNTER — TELEPHONE (OUTPATIENT)
Dept: INTERNAL MEDICINE CLINIC | Facility: CLINIC | Age: 88
End: 2023-03-23

## 2023-03-23 NOTE — TELEPHONE ENCOUNTER
Message left for Munira that Dr. Shruti Sandoval said ok regarding physician order and PT evaluation.

## 2023-03-23 NOTE — TELEPHONE ENCOUNTER
Per Hosea King, she is faxing a physician Order for 02/22/2023 and physical therapy evaluation 03/08/2023. Please advise.

## 2023-04-09 ENCOUNTER — APPOINTMENT (OUTPATIENT)
Dept: CT IMAGING | Facility: HOSPITAL | Age: 88
End: 2023-04-09
Attending: EMERGENCY MEDICINE
Payer: MEDICARE

## 2023-04-09 ENCOUNTER — HOSPITAL ENCOUNTER (EMERGENCY)
Facility: HOSPITAL | Age: 88
Discharge: HOME | End: 2023-04-09
Attending: EMERGENCY MEDICINE
Payer: MEDICARE

## 2023-04-09 VITALS
OXYGEN SATURATION: 97 % | HEART RATE: 78 BPM | RESPIRATION RATE: 20 BRPM | BODY MASS INDEX: 33 KG/M2 | TEMPERATURE: 98 F | WEIGHT: 173.94 LBS | SYSTOLIC BLOOD PRESSURE: 148 MMHG | DIASTOLIC BLOOD PRESSURE: 70 MMHG
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
## 2023-04-09 DIAGNOSIS — R42 ORTHOSTATIC DIZZINESS: ICD-10-CM

## 2023-04-09 LAB
ANION GAP SERPL CALC-SCNC: 7 MMOL/L (ref 0–18)
BASOPHILS # BLD AUTO: 0.06 X10(3) UL (ref 0–0.2)
BASOPHILS NFR BLD AUTO: 0.8 %
BUN BLD-MCNC: 22 MG/DL (ref 7–18)
BUN/CREAT SERPL: 20 (ref 10–20)
CALCIUM BLD-MCNC: 9.3 MG/DL (ref 8.5–10.1)
CHLORIDE SERPL-SCNC: 108 MMOL/L (ref 98–112)
CO2 SERPL-SCNC: 27 MMOL/L (ref 21–32)
CREAT BLD-MCNC: 1.1 MG/DL
DEPRECATED RDW RBC AUTO: 46 FL (ref 35.1–46.3)
EOSINOPHIL # BLD AUTO: 0.17 X10(3) UL (ref 0–0.7)
EOSINOPHIL NFR BLD AUTO: 2.2 %
ERYTHROCYTE [DISTWIDTH] IN BLOOD BY AUTOMATED COUNT: 13.6 % (ref 11–15)
GFR SERPLBLD BASED ON 1.73 SQ M-ARVRAT: 48 ML/MIN/1.73M2 (ref 60–?)
GLUCOSE BLD-MCNC: 137 MG/DL (ref 70–99)
HCT VFR BLD AUTO: 40.9 %
HGB BLD-MCNC: 12.7 G/DL
IMM GRANULOCYTES # BLD AUTO: 0.02 X10(3) UL (ref 0–1)
IMM GRANULOCYTES NFR BLD: 0.3 %
LYMPHOCYTES # BLD AUTO: 2.16 X10(3) UL (ref 1–4)
LYMPHOCYTES NFR BLD AUTO: 27.3 %
MCH RBC QN AUTO: 28.4 PG (ref 26–34)
MCHC RBC AUTO-ENTMCNC: 31.1 G/DL (ref 31–37)
MCV RBC AUTO: 91.5 FL
MONOCYTES # BLD AUTO: 0.5 X10(3) UL (ref 0.1–1)
MONOCYTES NFR BLD AUTO: 6.3 %
NEUTROPHILS # BLD AUTO: 4.99 X10 (3) UL (ref 1.5–7.7)
NEUTROPHILS # BLD AUTO: 4.99 X10(3) UL (ref 1.5–7.7)
NEUTROPHILS NFR BLD AUTO: 63.1 %
OSMOLALITY SERPL CALC.SUM OF ELEC: 299 MOSM/KG (ref 275–295)
PLATELET # BLD AUTO: 239 10(3)UL (ref 150–450)
POTASSIUM SERPL-SCNC: 3.2 MMOL/L (ref 3.5–5.1)
RBC # BLD AUTO: 4.47 X10(6)UL
SODIUM SERPL-SCNC: 142 MMOL/L (ref 136–145)
TROPONIN I HIGH SENSITIVITY: 10 NG/L
WBC # BLD AUTO: 7.9 X10(3) UL (ref 4–11)

## 2023-04-09 PROCEDURE — 93005 ELECTROCARDIOGRAM TRACING: CPT

## 2023-04-09 PROCEDURE — 80048 BASIC METABOLIC PNL TOTAL CA: CPT | Performed by: EMERGENCY MEDICINE

## 2023-04-09 PROCEDURE — 99284 EMERGENCY DEPT VISIT MOD MDM: CPT

## 2023-04-09 PROCEDURE — 99285 EMERGENCY DEPT VISIT HI MDM: CPT

## 2023-04-09 PROCEDURE — 93010 ELECTROCARDIOGRAM REPORT: CPT

## 2023-04-09 PROCEDURE — 36415 COLL VENOUS BLD VENIPUNCTURE: CPT

## 2023-04-09 PROCEDURE — 84484 ASSAY OF TROPONIN QUANT: CPT | Performed by: EMERGENCY MEDICINE

## 2023-04-09 PROCEDURE — 85025 COMPLETE CBC W/AUTO DIFF WBC: CPT | Performed by: EMERGENCY MEDICINE

## 2023-04-09 PROCEDURE — 70450 CT HEAD/BRAIN W/O DYE: CPT | Performed by: EMERGENCY MEDICINE

## 2023-04-09 NOTE — ED INITIAL ASSESSMENT (HPI)
The patient arrived after via EMS following a fall at home where she lives with her daughter. She reports that she lost her balance, falling and hitting her head on a cabinet in the kitchen. She denies loss of consciousness or pain. She reports that she felt lightheadedness when she was being helped up by medics after the fall. The patient has a history eliquis use. She denies chest pain, nausea or shortness of breath. She is currently alert and oriented x 4 of 4.

## 2023-04-10 LAB
ATRIAL RATE: 82 BPM
P AXIS: 37 DEGREES
P-R INTERVAL: 182 MS
Q-T INTERVAL: 402 MS
QRS DURATION: 92 MS
QTC CALCULATION (BEZET): 469 MS
R AXIS: -33 DEGREES
T AXIS: 13 DEGREES
VENTRICULAR RATE: 82 BPM

## 2023-04-10 NOTE — ED QUICK NOTES
The patient ambulated effectively in the hallway with a steady gait and without complaint of lightheadedness.

## 2023-04-24 ENCOUNTER — PATIENT OUTREACH (OUTPATIENT)
Dept: INTERNAL MEDICINE CLINIC | Facility: CLINIC | Age: 88
End: 2023-04-24

## 2023-05-01 ENCOUNTER — MED REC SCAN ONLY (OUTPATIENT)
Dept: INTERNAL MEDICINE CLINIC | Facility: CLINIC | Age: 88
End: 2023-05-01

## 2023-06-05 ENCOUNTER — OFFICE VISIT (OUTPATIENT)
Dept: INTERNAL MEDICINE CLINIC | Facility: CLINIC | Age: 88
End: 2023-06-05

## 2023-06-05 VITALS
SYSTOLIC BLOOD PRESSURE: 135 MMHG | BODY MASS INDEX: 31.15 KG/M2 | WEIGHT: 165 LBS | HEART RATE: 62 BPM | TEMPERATURE: 97 F | HEIGHT: 61 IN | DIASTOLIC BLOOD PRESSURE: 80 MMHG | OXYGEN SATURATION: 95 %

## 2023-06-05 DIAGNOSIS — D22.9 SKIN MOLE: ICD-10-CM

## 2023-06-05 DIAGNOSIS — I10 PRIMARY HYPERTENSION: Primary | ICD-10-CM

## 2023-06-05 PROCEDURE — 3075F SYST BP GE 130 - 139MM HG: CPT | Performed by: INTERNAL MEDICINE

## 2023-06-05 PROCEDURE — 3008F BODY MASS INDEX DOCD: CPT | Performed by: INTERNAL MEDICINE

## 2023-06-05 PROCEDURE — 1125F AMNT PAIN NOTED PAIN PRSNT: CPT | Performed by: INTERNAL MEDICINE

## 2023-06-05 PROCEDURE — 3079F DIAST BP 80-89 MM HG: CPT | Performed by: INTERNAL MEDICINE

## 2023-06-05 PROCEDURE — 1159F MED LIST DOCD IN RCRD: CPT | Performed by: INTERNAL MEDICINE

## 2023-06-05 PROCEDURE — 99214 OFFICE O/P EST MOD 30 MIN: CPT | Performed by: INTERNAL MEDICINE

## 2023-07-05 ENCOUNTER — MED REC SCAN ONLY (OUTPATIENT)
Dept: INTERNAL MEDICINE CLINIC | Facility: CLINIC | Age: 88
End: 2023-07-05

## 2023-07-21 ENCOUNTER — TELEPHONE (OUTPATIENT)
Dept: INTERNAL MEDICINE CLINIC | Facility: CLINIC | Age: 88
End: 2023-07-21

## 2023-07-21 NOTE — TELEPHONE ENCOUNTER
Donya Chaudhary, would like to inform the doctor that there are home health orders that need to be signed for the patient. Donya Chaudhary, states that the will refax the orders.

## 2023-07-24 NOTE — TELEPHONE ENCOUNTER
Jade Orta from [facility] informed that Marj Pires will sign home health orders and will fax them back.

## 2023-09-11 ENCOUNTER — APPOINTMENT (OUTPATIENT)
Dept: MRI IMAGING | Facility: HOSPITAL | Age: 88
End: 2023-09-11
Attending: EMERGENCY MEDICINE
Payer: MEDICARE

## 2023-09-11 ENCOUNTER — HOSPITAL ENCOUNTER (EMERGENCY)
Facility: HOSPITAL | Age: 88
Discharge: HOME | End: 2023-09-11
Attending: EMERGENCY MEDICINE
Payer: MEDICARE

## 2023-09-11 VITALS
HEIGHT: 60 IN | TEMPERATURE: 98 F | OXYGEN SATURATION: 96 % | WEIGHT: 155 LBS | SYSTOLIC BLOOD PRESSURE: 183 MMHG | DIASTOLIC BLOOD PRESSURE: 87 MMHG | HEART RATE: 57 BPM | BODY MASS INDEX: 30.43 KG/M2 | RESPIRATION RATE: 18 BRPM

## 2023-09-11 DIAGNOSIS — R03.0 ELEVATED BLOOD PRESSURE READING: ICD-10-CM

## 2023-09-11 DIAGNOSIS — R42 DIZZINESS: Primary | ICD-10-CM

## 2023-09-11 LAB
ANION GAP SERPL CALC-SCNC: 4 MMOL/L (ref 0–18)
BASOPHILS # BLD AUTO: 0.08 X10(3) UL (ref 0–0.2)
BASOPHILS NFR BLD AUTO: 1 %
BUN BLD-MCNC: 12 MG/DL (ref 7–18)
BUN/CREAT SERPL: 14.6 (ref 10–20)
CALCIUM BLD-MCNC: 9.6 MG/DL (ref 8.5–10.1)
CHLORIDE SERPL-SCNC: 111 MMOL/L (ref 98–112)
CO2 SERPL-SCNC: 28 MMOL/L (ref 21–32)
CREAT BLD-MCNC: 0.82 MG/DL
DEPRECATED RDW RBC AUTO: 45.6 FL (ref 35.1–46.3)
EGFRCR SERPLBLD CKD-EPI 2021: 68 ML/MIN/1.73M2 (ref 60–?)
EOSINOPHIL # BLD AUTO: 0.26 X10(3) UL (ref 0–0.7)
EOSINOPHIL NFR BLD AUTO: 3.4 %
ERYTHROCYTE [DISTWIDTH] IN BLOOD BY AUTOMATED COUNT: 14.1 % (ref 11–15)
GLUCOSE BLD-MCNC: 90 MG/DL (ref 70–99)
GLUCOSE BLDC GLUCOMTR-MCNC: 82 MG/DL (ref 70–99)
HCT VFR BLD AUTO: 44.4 %
HGB BLD-MCNC: 14.3 G/DL
IMM GRANULOCYTES # BLD AUTO: 0.01 X10(3) UL (ref 0–1)
IMM GRANULOCYTES NFR BLD: 0.1 %
LYMPHOCYTES # BLD AUTO: 2.43 X10(3) UL (ref 1–4)
LYMPHOCYTES NFR BLD AUTO: 31.8 %
MCH RBC QN AUTO: 28.5 PG (ref 26–34)
MCHC RBC AUTO-ENTMCNC: 32.2 G/DL (ref 31–37)
MCV RBC AUTO: 88.4 FL
MONOCYTES # BLD AUTO: 0.54 X10(3) UL (ref 0.1–1)
MONOCYTES NFR BLD AUTO: 7.1 %
NEUTROPHILS # BLD AUTO: 4.33 X10 (3) UL (ref 1.5–7.7)
NEUTROPHILS # BLD AUTO: 4.33 X10(3) UL (ref 1.5–7.7)
NEUTROPHILS NFR BLD AUTO: 56.6 %
OSMOLALITY SERPL CALC.SUM OF ELEC: 295 MOSM/KG (ref 275–295)
PLATELET # BLD AUTO: 253 10(3)UL (ref 150–450)
POTASSIUM SERPL-SCNC: 4.1 MMOL/L (ref 3.5–5.1)
RBC # BLD AUTO: 5.02 X10(6)UL
SODIUM SERPL-SCNC: 143 MMOL/L (ref 136–145)
WBC # BLD AUTO: 7.7 X10(3) UL (ref 4–11)

## 2023-09-11 PROCEDURE — 82962 GLUCOSE BLOOD TEST: CPT

## 2023-09-11 PROCEDURE — 85025 COMPLETE CBC W/AUTO DIFF WBC: CPT | Performed by: EMERGENCY MEDICINE

## 2023-09-11 PROCEDURE — 70551 MRI BRAIN STEM W/O DYE: CPT | Performed by: EMERGENCY MEDICINE

## 2023-09-11 PROCEDURE — 93005 ELECTROCARDIOGRAM TRACING: CPT

## 2023-09-11 PROCEDURE — 99285 EMERGENCY DEPT VISIT HI MDM: CPT

## 2023-09-11 PROCEDURE — 93010 ELECTROCARDIOGRAM REPORT: CPT

## 2023-09-11 PROCEDURE — 80048 BASIC METABOLIC PNL TOTAL CA: CPT | Performed by: EMERGENCY MEDICINE

## 2023-09-11 PROCEDURE — 36415 COLL VENOUS BLD VENIPUNCTURE: CPT

## 2023-09-11 NOTE — ED QUICK NOTES
Called poison control per MD request for patient exposure to natural gas leak. Recommend: Check baseline labs, Respiratory and metabolic acidosis would be expected however not likely in this case per Carito Hoffman.      Spoke with Carito Hoffman, case #[id number]

## 2023-09-11 NOTE — ED INITIAL ASSESSMENT (HPI)
Patient arrived via EMS from home with family with c/o hypertension and dizziness. EMS reports /105 and a strong smell of gas in the residence. FD confirmed gas leak, negative carbon monoxide.

## 2023-09-12 LAB
ATRIAL RATE: 59 BPM
P AXIS: 61 DEGREES
P-R INTERVAL: 188 MS
Q-T INTERVAL: 426 MS
QRS DURATION: 90 MS
QTC CALCULATION (BEZET): 421 MS
R AXIS: -36 DEGREES
T AXIS: 20 DEGREES
VENTRICULAR RATE: 59 BPM

## 2023-09-12 NOTE — ED QUICK NOTES
Received report, assumed care of pt. Pt resting on cart, NAD noted, IVF infusing, IV intact. Pt A&Ox4, respirations even and unlabored, skin warm and dry.

## 2023-09-12 NOTE — ED QUICK NOTES
Team discharge complete. Pt ambulatory in room with assistance with steady gait. Discharge instructions, follow up care, medication information and s/s to return to ED disucssed with pt, pt with no new questions or complaints at this time. Pt remains A&Ox4, respirations even and unlabored, skin warm and dry.

## 2023-09-13 ENCOUNTER — OFFICE VISIT (OUTPATIENT)
Dept: INTERNAL MEDICINE CLINIC | Facility: CLINIC | Age: 88
End: 2023-09-13

## 2023-09-13 VITALS
HEART RATE: 73 BPM | SYSTOLIC BLOOD PRESSURE: 135 MMHG | DIASTOLIC BLOOD PRESSURE: 80 MMHG | HEIGHT: 60 IN | TEMPERATURE: 98 F | OXYGEN SATURATION: 97 % | WEIGHT: 164 LBS | BODY MASS INDEX: 32.2 KG/M2

## 2023-09-13 DIAGNOSIS — H91.93 BILATERAL HEARING LOSS, UNSPECIFIED HEARING LOSS TYPE: Primary | ICD-10-CM

## 2023-09-13 PROCEDURE — 1160F RVW MEDS BY RX/DR IN RCRD: CPT | Performed by: INTERNAL MEDICINE

## 2023-09-13 PROCEDURE — 3075F SYST BP GE 130 - 139MM HG: CPT | Performed by: INTERNAL MEDICINE

## 2023-09-13 PROCEDURE — 99214 OFFICE O/P EST MOD 30 MIN: CPT | Performed by: INTERNAL MEDICINE

## 2023-09-13 PROCEDURE — 1159F MED LIST DOCD IN RCRD: CPT | Performed by: INTERNAL MEDICINE

## 2023-09-13 PROCEDURE — 3079F DIAST BP 80-89 MM HG: CPT | Performed by: INTERNAL MEDICINE

## 2023-09-13 PROCEDURE — 1126F AMNT PAIN NOTED NONE PRSNT: CPT | Performed by: INTERNAL MEDICINE

## 2023-09-13 PROCEDURE — 3008F BODY MASS INDEX DOCD: CPT | Performed by: INTERNAL MEDICINE

## 2023-09-15 ENCOUNTER — OFFICE VISIT (OUTPATIENT)
Dept: OTOLARYNGOLOGY | Facility: CLINIC | Age: 88
End: 2023-09-15

## 2023-09-15 VITALS — BODY MASS INDEX: 32 KG/M2 | WEIGHT: 164 LBS

## 2023-09-15 DIAGNOSIS — H90.3 SENSORINEURAL HEARING LOSS (SNHL) OF BOTH EARS: ICD-10-CM

## 2023-09-15 DIAGNOSIS — H61.23 BILATERAL IMPACTED CERUMEN: ICD-10-CM

## 2023-09-15 DIAGNOSIS — R42 DIZZINESS: Primary | ICD-10-CM

## 2023-09-27 ENCOUNTER — OFFICE VISIT (OUTPATIENT)
Dept: DERMATOLOGY CLINIC | Facility: CLINIC | Age: 88
End: 2023-09-27

## 2023-09-27 ENCOUNTER — TELEPHONE (OUTPATIENT)
Facility: CLINIC | Age: 88
End: 2023-09-27

## 2023-09-27 DIAGNOSIS — D22.9 MULTIPLE BENIGN NEVI: ICD-10-CM

## 2023-09-27 DIAGNOSIS — L24.9 IRRITANT DERMATITIS: ICD-10-CM

## 2023-09-27 DIAGNOSIS — D18.01 CHERRY ANGIOMA: ICD-10-CM

## 2023-09-27 DIAGNOSIS — B07.9 VERRUCA VULGARIS: ICD-10-CM

## 2023-09-27 DIAGNOSIS — L57.0 ACTINIC KERATOSIS: ICD-10-CM

## 2023-09-27 DIAGNOSIS — L82.1 SEBORRHEIC KERATOSES: Primary | ICD-10-CM

## 2023-09-27 DIAGNOSIS — L81.4 LENTIGINES: ICD-10-CM

## 2023-09-27 PROCEDURE — 17000 DESTRUCT PREMALG LESION: CPT | Performed by: STUDENT IN AN ORGANIZED HEALTH CARE EDUCATION/TRAINING PROGRAM

## 2023-09-27 PROCEDURE — 1159F MED LIST DOCD IN RCRD: CPT | Performed by: STUDENT IN AN ORGANIZED HEALTH CARE EDUCATION/TRAINING PROGRAM

## 2023-09-27 PROCEDURE — 17110 DESTRUCTION B9 LES UP TO 14: CPT | Performed by: STUDENT IN AN ORGANIZED HEALTH CARE EDUCATION/TRAINING PROGRAM

## 2023-09-27 PROCEDURE — 99204 OFFICE O/P NEW MOD 45 MIN: CPT | Performed by: STUDENT IN AN ORGANIZED HEALTH CARE EDUCATION/TRAINING PROGRAM

## 2023-09-27 PROCEDURE — 1126F AMNT PAIN NOTED NONE PRSNT: CPT | Performed by: STUDENT IN AN ORGANIZED HEALTH CARE EDUCATION/TRAINING PROGRAM

## 2023-09-27 PROCEDURE — 1160F RVW MEDS BY RX/DR IN RCRD: CPT | Performed by: STUDENT IN AN ORGANIZED HEALTH CARE EDUCATION/TRAINING PROGRAM

## 2023-09-27 NOTE — TELEPHONE ENCOUNTER
Last office visit faxed to [facility]: [phone]   Fax # [phone]

## 2023-09-27 NOTE — TELEPHONE ENCOUNTER
Patients daughter requesting to get order for home health care [facility]  # [phone]

## 2023-09-27 NOTE — PROGRESS NOTES
September 27, 2023    New patient     CHIEF COMPLAINT: FBSE    HISTORY OF PRESENT ILLNESS: .    1. Lesion  Location: right cheek   Duration: over 1 year  Signs and symptoms: none  Current treatment: none  Past treatments: none    DERM HISTORY:  History of skin cancer: No    FAMILY HISTORY:  History of melanoma: No    PAST MEDICAL HISTORY:  Past Medical History:   Diagnosis Date    Diabetes ([facility] [address]     Essential hypertension        REVIEW OF SYSTEMS:  Constitutional: Denies fever, chills, unintentional weight loss. Skin as per HPI    Medications  Current Outpatient Medications   Medication Sig Dispense Refill    metoprolol succinate ER 50 MG Oral Tablet 24 Hr Take 1 tablet (50 mg total) by mouth Daily Beta Blocker. 30 tablet 0    meclizine 12.5 MG Oral Tab Take 1 tablet (12.5 mg total) by mouth 3 (three) times daily as needed for Dizziness. 20 tablet 0    apixaban 5 MG Oral Tab Take 1 tablet (5 mg total) by mouth 2 (two) times daily. atorvastatin 40 MG Oral Tab Take 1 tablet (40 mg total) by mouth nightly. PHYSICAL EXAM:  Patient declined chaperone   General: awake, alert, no acute distress  Skin: Skin exam was performed today including the following: head and face, scalp, neck, chest (including breasts and axillae), abdomen, back, bilateral upper extremities, bilateral lower extremities, hands, feet, digits, nails. Pertinent findings include:   - Scattered bright red-purple dome-shaped papules on the trunk and extremities   - Scattered light brown stellate macules on sun exposed sites  - Scattered, evenly colored, round brown macules and papules with regular borders on the trunk and extremities  - Numerous scattered skin-colored and brown, waxy, stuck-on papules and plaques on the trunk and extremities  - R temple with a pink gritty papule  - R cheek with a verrucous papule  - NLF with pink scaly papules    ASSESSMENT & PLAN:  Pathophysiology of diagnoses discussed with patient.   Therapeutic options reviewed. Risks, benefits, and alternatives discussed with patient. Instructions reviewed at length. #Lentigines  #Seborrheic keratoses   #Cherry angiomas   - Reassurance provided regarding the benign nature of these lesions. #Multiple benign nevi  - Complete skin exam performed today with no outlier lesions identified   - Reassured patient of benign nature of these lesions.   - Recommend daily photoprotection with broad-spectrum sunscreen, avoidance of sun during peak hours, and sun protective clothing.    - Dermoscopy was used for physical examination of pigmented lesions during today's office visit. #Actinic Keratosis  - Discussed premalignant etiology and possibility of transformation to [facility]  - Recommended cryotherapy today   - Discussed side effects including redness, swelling, crusting, and discolortion after treatment, wound care with soap/water and vaseline     - Procedure Note Cryosurgery of pre-malignant lesion(s)  Risks, benefits, alternatives, complications, and personnel required for cryosurgery reviewed with patient. Patient verbalizes understanding and wishes to proceed. - Cryosurgery performed with Liquid Nitrogen via cryostat spray gun to Actinic Keratosis. 1 lesion(s) treated. - Patient tolerated well and wound care discussed. Return if lesions fail to fully resolve. #Verruca vulgaris  - Patient elected cryotherapy today     - Cryosurgery of non-malignant lesion(s)    Risks, benefits, alternatives and personnel required for cryosurgery reviewed with patient. Possible adverse effects reviewed including, but not limited to: redness, swelling, blister formation, postinflammatory pigment alteration, ring wart formation, scarring, recurrence. Pt verbalizes understanding and wishes to proceed. Cryosurgery performed with Liquid Nitrogen via cryostat spray gun to warts. 1 lesion(s) treated. Patient tolerated well.     #Irritant dermatitis  - Start hydrocortisone 2.5% to affected areas on face up to twice daily Monday-Friday Take weekends off.     Return to clinic: 1 year  or sooner if something concerning arises     Yenny Ridley MD

## 2023-09-27 NOTE — TELEPHONE ENCOUNTER
Nay with [facility] and [facility] is requesting documentation to start [address]    requesting last appointment notes     Need face to face     Fax [phone]

## 2023-10-06 ENCOUNTER — HOSPITAL ENCOUNTER (EMERGENCY)
Facility: HOSPITAL | Age: 88
Discharge: HOME | End: 2023-10-06
Attending: EMERGENCY MEDICINE

## 2023-10-06 ENCOUNTER — APPOINTMENT (OUTPATIENT)
Dept: GENERAL RADIOLOGY | Facility: HOSPITAL | Age: 88
End: 2023-10-06
Attending: EMERGENCY MEDICINE

## 2023-10-06 VITALS
RESPIRATION RATE: 14 BRPM | TEMPERATURE: 97 F | HEART RATE: 60 BPM | OXYGEN SATURATION: 95 % | SYSTOLIC BLOOD PRESSURE: 134 MMHG | DIASTOLIC BLOOD PRESSURE: 79 MMHG

## 2023-10-06 DIAGNOSIS — K62.3 RECTAL PROLAPSE: ICD-10-CM

## 2023-10-06 DIAGNOSIS — K64.9 BLEEDING HEMORRHOID: ICD-10-CM

## 2023-10-06 DIAGNOSIS — K59.00 CONSTIPATION, UNSPECIFIED CONSTIPATION TYPE: Primary | ICD-10-CM

## 2023-10-06 PROCEDURE — 99283 EMERGENCY DEPT VISIT LOW MDM: CPT

## 2023-10-06 PROCEDURE — 74018 RADEX ABDOMEN 1 VIEW: CPT | Performed by: EMERGENCY MEDICINE

## 2023-10-06 PROCEDURE — 99284 EMERGENCY DEPT VISIT MOD MDM: CPT

## 2023-10-06 NOTE — ED INITIAL ASSESSMENT (HPI)
Pt to ED via EMS for constipation x2 weeks, last BM 2 weeks ago. Pt complains of pressure at her rectum, denies any abdominal pain.

## 2023-10-06 NOTE — DISCHARGE INSTRUCTIONS
Use MiraLAX daily, stool softeners daily and prunes daily  Drink plenty of water  Follow-up with your doctor

## 2023-10-07 ENCOUNTER — APPOINTMENT (OUTPATIENT)
Dept: CT IMAGING | Facility: HOSPITAL | Age: 88
End: 2023-10-07
Attending: EMERGENCY MEDICINE

## 2023-10-07 ENCOUNTER — HOSPITAL ENCOUNTER (OUTPATIENT)
Facility: HOSPITAL | Age: 88
Setting detail: OBSERVATION
Discharge: HOME HEALTH | DRG: 392 | End: 2023-10-10
Attending: EMERGENCY MEDICINE | Admitting: INTERNAL MEDICINE

## 2023-10-07 ENCOUNTER — TELEPHONE (OUTPATIENT)
Dept: INTERNAL MEDICINE CLINIC | Facility: CLINIC | Age: 88
End: 2023-10-07

## 2023-10-07 ENCOUNTER — APPOINTMENT (OUTPATIENT)
Dept: CT IMAGING | Facility: HOSPITAL | Age: 88
DRG: 392 | End: 2023-10-07
Attending: EMERGENCY MEDICINE

## 2023-10-07 ENCOUNTER — HOSPITAL ENCOUNTER (INPATIENT)
Facility: HOSPITAL | Age: 88
LOS: 3 days | Discharge: HOME HEALTH | End: 2023-10-10
Attending: EMERGENCY MEDICINE | Admitting: INTERNAL MEDICINE

## 2023-10-07 DIAGNOSIS — K52.89 STERCORAL COLITIS: Primary | ICD-10-CM

## 2023-10-07 DIAGNOSIS — K62.5 RECTAL BLEEDING: ICD-10-CM

## 2023-10-07 DIAGNOSIS — K56.41 IMPACTED STOOL IN RECTUM (HCC): ICD-10-CM

## 2023-10-07 PROBLEM — H32 HISTOPLASMA CAPSULATUM WITH RETINITIS: Status: ACTIVE | Noted: 2023-10-07

## 2023-10-07 PROBLEM — B39.4 HISTOPLASMA CAPSULATUM WITH RETINITIS: Status: ACTIVE | Noted: 2023-10-07

## 2023-10-07 PROBLEM — H54.8 LEGAL BLINDNESS: Status: ACTIVE | Noted: 2023-01-04

## 2023-10-07 PROBLEM — I10 ESSENTIAL HYPERTENSION, BENIGN: Status: ACTIVE | Noted: 2023-03-23

## 2023-10-07 PROBLEM — I48.91 ATRIAL FIBRILLATION WITH RAPID VENTRICULAR RESPONSE (HCC): Status: ACTIVE | Noted: 2023-10-07

## 2023-10-07 PROBLEM — N18.2 STAGE 2 CHRONIC KIDNEY DISEASE: Status: ACTIVE | Noted: 2023-01-04

## 2023-10-07 PROBLEM — E11.9 TYPE 2 DIABETES MELLITUS (HCC): Status: ACTIVE | Noted: 2023-01-09

## 2023-10-07 PROBLEM — I35.0 AORTIC VALVE STENOSIS: Status: ACTIVE | Noted: 2023-01-04

## 2023-10-07 PROBLEM — I63.9 CEREBROVASCULAR ACCIDENT (HCC): Status: ACTIVE | Noted: 2023-01-04

## 2023-10-07 LAB
ANION GAP SERPL CALC-SCNC: 7 MMOL/L (ref 0–18)
ANTIBODY SCREEN: NEGATIVE
BASOPHILS # BLD AUTO: 0.04 X10(3) UL (ref 0–0.2)
BASOPHILS NFR BLD AUTO: 0.6 %
BUN BLD-MCNC: 16 MG/DL (ref 7–18)
BUN/CREAT SERPL: 15 (ref 10–20)
CALCIUM BLD-MCNC: 9.2 MG/DL (ref 8.5–10.1)
CHLORIDE SERPL-SCNC: 109 MMOL/L (ref 98–112)
CO2 SERPL-SCNC: 26 MMOL/L (ref 21–32)
CREAT BLD-MCNC: 1.07 MG/DL
DEPRECATED RDW RBC AUTO: 45.6 FL (ref 35.1–46.3)
EGFRCR SERPLBLD CKD-EPI 2021: 49 ML/MIN/1.73M2 (ref 60–?)
EOSINOPHIL # BLD AUTO: 0.07 X10(3) UL (ref 0–0.7)
EOSINOPHIL NFR BLD AUTO: 1 %
ERYTHROCYTE [DISTWIDTH] IN BLOOD BY AUTOMATED COUNT: 14 % (ref 11–15)
EST. AVERAGE GLUCOSE BLD GHB EST-MCNC: 117 MG/DL (ref 68–126)
GLUCOSE BLD-MCNC: 141 MG/DL (ref 70–99)
GLUCOSE BLDC GLUCOMTR-MCNC: 94 MG/DL (ref 70–99)
HBA1C MFR BLD: 5.7 % (ref ?–5.7)
HCT VFR BLD AUTO: 42.5 %
HGB BLD-MCNC: 13.7 G/DL
IMM GRANULOCYTES # BLD AUTO: 0.02 X10(3) UL (ref 0–1)
IMM GRANULOCYTES NFR BLD: 0.3 %
LYMPHOCYTES # BLD AUTO: 1.05 X10(3) UL (ref 1–4)
LYMPHOCYTES NFR BLD AUTO: 15.7 %
MCH RBC QN AUTO: 28.5 PG (ref 26–34)
MCHC RBC AUTO-ENTMCNC: 32.2 G/DL (ref 31–37)
MCV RBC AUTO: 88.5 FL
MONOCYTES # BLD AUTO: 0.54 X10(3) UL (ref 0.1–1)
MONOCYTES NFR BLD AUTO: 8.1 %
NEUTROPHILS # BLD AUTO: 4.97 X10 (3) UL (ref 1.5–7.7)
NEUTROPHILS # BLD AUTO: 4.97 X10(3) UL (ref 1.5–7.7)
NEUTROPHILS NFR BLD AUTO: 74.3 %
OSMOLALITY SERPL CALC.SUM OF ELEC: 298 MOSM/KG (ref 275–295)
PLATELET # BLD AUTO: 192 10(3)UL (ref 150–450)
POTASSIUM SERPL-SCNC: 3.7 MMOL/L (ref 3.5–5.1)
RBC # BLD AUTO: 4.8 X10(6)UL
RH BLOOD TYPE: POSITIVE
RH BLOOD TYPE: POSITIVE
SODIUM SERPL-SCNC: 142 MMOL/L (ref 136–145)
WBC # BLD AUTO: 6.7 X10(3) UL (ref 4–11)

## 2023-10-07 PROCEDURE — 74177 CT ABD & PELVIS W/CONTRAST: CPT | Performed by: EMERGENCY MEDICINE

## 2023-10-07 RX ORDER — AMLODIPINE BESYLATE 2.5 MG/1
2.5 TABLET ORAL DAILY PRN
COMMUNITY

## 2023-10-07 RX ORDER — ACETAMINOPHEN 325 MG/1
650 TABLET ORAL EVERY 4 HOURS PRN
Status: DISCONTINUED | OUTPATIENT
Start: 2023-10-07 | End: 2023-10-10

## 2023-10-07 RX ORDER — MORPHINE SULFATE 2 MG/ML
1 INJECTION, SOLUTION INTRAMUSCULAR; INTRAVENOUS EVERY 2 HOUR PRN
Status: DISCONTINUED | OUTPATIENT
Start: 2023-10-07 | End: 2023-10-10

## 2023-10-07 RX ORDER — SODIUM CHLORIDE 9 MG/ML
INJECTION, SOLUTION INTRAVENOUS ONCE
Status: COMPLETED | OUTPATIENT
Start: 2023-10-07 | End: 2023-10-07

## 2023-10-07 RX ORDER — MORPHINE SULFATE 4 MG/ML
4 INJECTION, SOLUTION INTRAMUSCULAR; INTRAVENOUS EVERY 2 HOUR PRN
Status: DISCONTINUED | OUTPATIENT
Start: 2023-10-07 | End: 2023-10-10

## 2023-10-07 RX ORDER — NALOXONE HYDROCHLORIDE 0.4 MG/ML
0.08 INJECTION, SOLUTION INTRAMUSCULAR; INTRAVENOUS; SUBCUTANEOUS
Status: DISCONTINUED | OUTPATIENT
Start: 2023-10-07 | End: 2023-10-10

## 2023-10-07 RX ORDER — MORPHINE SULFATE 2 MG/ML
2 INJECTION, SOLUTION INTRAMUSCULAR; INTRAVENOUS ONCE
Status: COMPLETED | OUTPATIENT
Start: 2023-10-07 | End: 2023-10-07

## 2023-10-07 RX ORDER — MORPHINE SULFATE 2 MG/ML
2 INJECTION, SOLUTION INTRAMUSCULAR; INTRAVENOUS EVERY 2 HOUR PRN
Status: DISCONTINUED | OUTPATIENT
Start: 2023-10-07 | End: 2023-10-10

## 2023-10-07 RX ORDER — METOPROLOL SUCCINATE 50 MG/1
50 TABLET, EXTENDED RELEASE ORAL
Status: DISCONTINUED | OUTPATIENT
Start: 2023-10-08 | End: 2023-10-08

## 2023-10-07 RX ORDER — ONDANSETRON 2 MG/ML
4 INJECTION INTRAMUSCULAR; INTRAVENOUS EVERY 6 HOURS PRN
Status: DISCONTINUED | OUTPATIENT
Start: 2023-10-07 | End: 2023-10-10

## 2023-10-07 RX ORDER — BISACODYL 10 MG
10 SUPPOSITORY, RECTAL RECTAL
Status: DISCONTINUED | OUTPATIENT
Start: 2023-10-07 | End: 2023-10-10

## 2023-10-07 RX ORDER — MELATONIN
3 NIGHTLY PRN
Status: DISCONTINUED | OUTPATIENT
Start: 2023-10-07 | End: 2023-10-10

## 2023-10-07 RX ORDER — POLYETHYLENE GLYCOL 3350 17 G/17G
17 POWDER, FOR SOLUTION ORAL DAILY PRN
Status: DISCONTINUED | OUTPATIENT
Start: 2023-10-07 | End: 2023-10-08

## 2023-10-07 RX ORDER — ACETAMINOPHEN 325 MG/1
650 TABLET ORAL EVERY 6 HOURS PRN
Status: DISCONTINUED | OUTPATIENT
Start: 2023-10-07 | End: 2023-10-10

## 2023-10-07 RX ORDER — AMLODIPINE BESYLATE 2.5 MG/1
2.5 TABLET ORAL DAILY PRN
Status: DISCONTINUED | OUTPATIENT
Start: 2023-10-07 | End: 2023-10-10

## 2023-10-07 RX ORDER — ACETAMINOPHEN AND CODEINE PHOSPHATE 300; 30 MG/1; MG/1
1 TABLET ORAL EVERY 4 HOURS PRN
Status: DISCONTINUED | OUTPATIENT
Start: 2023-10-07 | End: 2023-10-10

## 2023-10-07 RX ORDER — SENNOSIDES 8.6 MG
17.2 TABLET ORAL NIGHTLY PRN
Status: DISCONTINUED | OUTPATIENT
Start: 2023-10-07 | End: 2023-10-10

## 2023-10-07 RX ORDER — METOCLOPRAMIDE HYDROCHLORIDE 5 MG/ML
5 INJECTION INTRAMUSCULAR; INTRAVENOUS EVERY 8 HOURS PRN
Status: DISCONTINUED | OUTPATIENT
Start: 2023-10-07 | End: 2023-10-10

## 2023-10-07 RX ORDER — DOCUSATE SODIUM 100 MG/1
100 CAPSULE, LIQUID FILLED ORAL 2 TIMES DAILY
COMMUNITY

## 2023-10-07 RX ORDER — ATORVASTATIN CALCIUM 40 MG/1
40 TABLET, FILM COATED ORAL NIGHTLY
Status: DISCONTINUED | OUTPATIENT
Start: 2023-10-07 | End: 2023-10-10

## 2023-10-07 RX ORDER — ACETAMINOPHEN AND CODEINE PHOSPHATE 300; 30 MG/1; MG/1
2 TABLET ORAL EVERY 4 HOURS PRN
Status: DISCONTINUED | OUTPATIENT
Start: 2023-10-07 | End: 2023-10-10

## 2023-10-07 NOTE — TELEPHONE ENCOUNTER
Marcial Luna at [phone] called regarding Sofiya Abebe with severe constipation having issues. Phone will only go straight to voicemail. Left message to call back.

## 2023-10-07 NOTE — ED INITIAL ASSESSMENT (HPI)
Pt arrived through ems with complaints of bright red rectal bleeding that started earlier today. Pt is on Eliquis for hx of afib. Denies abdominal pain but reports rectal pain. Pt was seen here yesterday for constipation.

## 2023-10-07 NOTE — ED QUICK NOTES
Orders for admission, patient is aware of plan and ready to go upstairs. Any questions, please call ED RN Stephen Andujar at extension [phone].      Patient Covid vaccination status: Unvaccinated     COVID Test Ordered in ED: None    COVID Suspicion at Admission: N/A    Running Infusions:  None    Mental Status/LOC at time of transport: A&ox4     Other pertinent information:   CIWA score: N/A   NIH score:  N/A

## 2023-10-08 LAB
ALBUMIN SERPL-MCNC: 3.4 G/DL (ref 3.4–5)
ALBUMIN/GLOB SERPL: 0.9 {RATIO} (ref 1–2)
ALP LIVER SERPL-CCNC: 89 U/L
ALT SERPL-CCNC: 29 U/L
ANION GAP SERPL CALC-SCNC: 4 MMOL/L (ref 0–18)
AST SERPL-CCNC: 21 U/L (ref 15–37)
BASOPHILS # BLD AUTO: 0.06 X10(3) UL (ref 0–0.2)
BASOPHILS NFR BLD AUTO: 0.8 %
BILIRUB SERPL-MCNC: 1 MG/DL (ref 0.1–2)
BUN BLD-MCNC: 14 MG/DL (ref 7–18)
BUN/CREAT SERPL: 15.4 (ref 10–20)
CALCIUM BLD-MCNC: 9 MG/DL (ref 8.5–10.1)
CHLORIDE SERPL-SCNC: 110 MMOL/L (ref 98–112)
CO2 SERPL-SCNC: 29 MMOL/L (ref 21–32)
CREAT BLD-MCNC: 0.91 MG/DL
DEPRECATED RDW RBC AUTO: 46.7 FL (ref 35.1–46.3)
EGFRCR SERPLBLD CKD-EPI 2021: 60 ML/MIN/1.73M2 (ref 60–?)
EOSINOPHIL # BLD AUTO: 0.14 X10(3) UL (ref 0–0.7)
EOSINOPHIL NFR BLD AUTO: 2 %
ERYTHROCYTE [DISTWIDTH] IN BLOOD BY AUTOMATED COUNT: 14 % (ref 11–15)
GLOBULIN PLAS-MCNC: 3.6 G/DL (ref 2.8–4.4)
GLUCOSE BLD-MCNC: 107 MG/DL (ref 70–99)
GLUCOSE BLDC GLUCOMTR-MCNC: 100 MG/DL (ref 70–99)
GLUCOSE BLDC GLUCOMTR-MCNC: 106 MG/DL (ref 70–99)
GLUCOSE BLDC GLUCOMTR-MCNC: 154 MG/DL (ref 70–99)
GLUCOSE BLDC GLUCOMTR-MCNC: 176 MG/DL (ref 70–99)
GLUCOSE BLDC GLUCOMTR-MCNC: 210 MG/DL (ref 70–99)
GLUCOSE BLDC GLUCOMTR-MCNC: 92 MG/DL (ref 70–99)
HCT VFR BLD AUTO: 40.9 %
HCT VFR BLD AUTO: 42.1 %
HCT VFR BLD AUTO: 42.7 %
HCT VFR BLD AUTO: 42.8 %
HCT VFR BLD AUTO: 43.1 %
HGB BLD-MCNC: 13.3 G/DL
HGB BLD-MCNC: 13.6 G/DL
HGB BLD-MCNC: 13.6 G/DL
HGB BLD-MCNC: 13.7 G/DL
HGB BLD-MCNC: 14 G/DL
IMM GRANULOCYTES # BLD AUTO: 0.01 X10(3) UL (ref 0–1)
IMM GRANULOCYTES NFR BLD: 0.1 %
LYMPHOCYTES # BLD AUTO: 1.82 X10(3) UL (ref 1–4)
LYMPHOCYTES NFR BLD AUTO: 25.8 %
MCH RBC QN AUTO: 28.7 PG (ref 26–34)
MCHC RBC AUTO-ENTMCNC: 31.8 G/DL (ref 31–37)
MCV RBC AUTO: 90.3 FL
MONOCYTES # BLD AUTO: 0.76 X10(3) UL (ref 0.1–1)
MONOCYTES NFR BLD AUTO: 10.8 %
NEUTROPHILS # BLD AUTO: 4.27 X10 (3) UL (ref 1.5–7.7)
NEUTROPHILS # BLD AUTO: 4.27 X10(3) UL (ref 1.5–7.7)
NEUTROPHILS NFR BLD AUTO: 60.5 %
OSMOLALITY SERPL CALC.SUM OF ELEC: 297 MOSM/KG (ref 275–295)
PLATELET # BLD AUTO: 207 10(3)UL (ref 150–450)
POTASSIUM SERPL-SCNC: 3.9 MMOL/L (ref 3.5–5.1)
PROT SERPL-MCNC: 7 G/DL (ref 6.4–8.2)
RBC # BLD AUTO: 4.74 X10(6)UL
SODIUM SERPL-SCNC: 143 MMOL/L (ref 136–145)
WBC # BLD AUTO: 7.1 X10(3) UL (ref 4–11)

## 2023-10-08 PROCEDURE — 99223 1ST HOSP IP/OBS HIGH 75: CPT | Performed by: INTERNAL MEDICINE

## 2023-10-08 RX ORDER — LIDOCAINE 50 MG/G
OINTMENT TOPICAL AS NEEDED
Status: DISCONTINUED | OUTPATIENT
Start: 2023-10-08 | End: 2023-10-10

## 2023-10-08 RX ORDER — HYDRALAZINE HYDROCHLORIDE 20 MG/ML
5 INJECTION INTRAMUSCULAR; INTRAVENOUS EVERY 6 HOURS PRN
Status: DISCONTINUED | OUTPATIENT
Start: 2023-10-08 | End: 2023-10-10

## 2023-10-08 RX ORDER — METOPROLOL SUCCINATE 25 MG/1
25 TABLET, EXTENDED RELEASE ORAL
Status: DISCONTINUED | OUTPATIENT
Start: 2023-10-09 | End: 2023-10-10

## 2023-10-08 RX ORDER — POLYETHYLENE GLYCOL 3350 17 G/17G
34 POWDER, FOR SOLUTION ORAL 2 TIMES DAILY
Status: DISCONTINUED | OUTPATIENT
Start: 2023-10-08 | End: 2023-10-10

## 2023-10-08 NOTE — CM/SW NOTE
MD RAMSEY planning. PT/OT rec pending. With patient permission, SW spoke with daughter, Germán Willams via phone. Introduced self and role. Patient lives with daughter at (address correct on face sheet). No stairs, patient has her own room and bathroom. Patient recently moved in with her daughter from [facility]. Patient requires assistance with ADL's, has been having frequent falls at home. Patient has a cane, walker, wheel chair. PCP is Dr. Jeramie Underwood. SW notified daughter of pending PT recs, notified of possible [facility]. Daughter reports that the preference is for patient to return home, does not think she will be agreeable to [facility]. Daughter would like [facility] as patient has worked with them in the past.     Daughter states that she quit her job to care for patient full time, inquired about assistance through the [facility] includes the [facility]. SW notified that referral will be placed for DCSS to inquire if patient will qualify for any assistance. Daughter aware that assessment is income based. Daughter open to additional caregiver resources as well. No further questions or concerns at this time. Referral sent to [facility], orders/f2f placed. Awaiting confirmation*    SW called and LVM for DCSS for homemaker referral.       PLAN: Preference is home with home health. Referral pending to [facility] (patient preference). Orders/f2f placed.        MICHELLE Raphael, [facility]    [id number]

## 2023-10-08 NOTE — CONSULTS
Gastroenterology consultation note    Reason for consultation:  Constipation, rectal bleeding, abnormal CT scan      History of present illness: The patient is a 80year old female who is seen initially alone with her RN and subsequent with her daughter. The patient has a longstanding history of constipation. Her daughter relates that she sits at the stool for prolonged periods of time and may not have a bowel movement for up to several days. She has had a colonoscopy in the remote past.    Most recently the patient has been experiencing rectal bleeding. She presented to the ER on 10/6/2023 via EMS with rectal pain, bleeding and tenesmus. She takes numerous products at home including MiraLAX, stool softeners and OTC tea. She was found to have a large amount of hard stool in the rectum along with rectal prolapse. The patient was disimpacted and the prolapse reduced by the ED physician. An enema was also administered and she was discharged. On 10/7/2023 she developed rectal bleeding. She was brought to the ED by EMS. She was hemodynamically stable. Hemoglobin remained normal.  Rectal prolapse was again reduced. A CT scan revealed distal rectal wall thickening and inflammation. She was admitted to the hospital.    The patient is on systemic anticoagulation with apixaban. She has not had a bowel movement or bleeding since admission. The patient's appetite is fair. She had denies nausea, vomiting, abdominal pain. She is unsure if she has had bleeding previously. She does not recall the date of her colonoscopy.         Past Medical History:   Diagnosis Date    Arrhythmia     Diabetes ([facility] [address]     Essential hypertension     Hearing impairment     Visual impairment            Latex                   ITCHING  Sulfa Antibiotics       ITCHING      hydrALAzine (Apresoline) 20 mg/mL injection 5 mg, 5 mg, Intravenous, Q6H PRN  atorvastatin (Lipitor) tab 40 mg, 40 mg, Oral, Nightly  metoprolol succinate ER (Toprol XL) 24 hr tab 50 mg, 50 mg, Oral, Daily Beta Blocker  acetaminophen (Tylenol) tab 650 mg, 650 mg, Oral, Q6H PRN  morphINE PF 2 MG/ML injection 1 mg, 1 mg, Intravenous, Q2H PRN   Or  morphINE PF 2 MG/ML injection 2 mg, 2 mg, Intravenous, Q2H PRN   Or  morphINE PF 4 MG/ML injection 4 mg, 4 mg, Intravenous, Q2H PRN  acetaminophen (Tylenol) tab 650 mg, 650 mg, Oral, Q4H PRN   Or  acetaminophen-codeine (Tylenol #3) 300-30 MG per tab 1 tablet, 1 tablet, Oral, Q4H PRN   Or  acetaminophen-codeine (Tylenol #3) 300-30 MG per tab 2 tablet, 2 tablet, Oral, Q4H PRN  melatonin tab 3 mg, 3 mg, Oral, Nightly PRN  naloxone (Narcan) 0.4 MG/ML injection 0.08 mg, 0.08 mg, Intravenous, Q5 Min PRN  polyethylene glycol (PEG 3350) (Miralax) 17 g oral packet 17 g, 17 g, Oral, Daily PRN  sennosides (Senokot) tab 17.2 mg, 17.2 mg, Oral, Nightly PRN  bisacodyl (Dulcolax) 10 MG rectal suppository 10 mg, 10 mg, Rectal, Daily PRN  ondansetron (Zofran) 4 MG/2ML injection 4 mg, 4 mg, Intravenous, Q6H PRN  metoclopramide (Reglan) 5 mg/mL injection 5 mg, 5 mg, Intravenous, Q8H PRN  amLODIPine (Norvasc) tab 2.5 mg, 2.5 mg, Oral, Daily PRN      docusate sodium 100 MG Oral Cap, Take 1 capsule (100 mg total) by mouth 2 (two) times daily. , Disp: , Rfl:   amLODIPine 2.5 MG Oral Tab, Take 1 tablet (2.5 mg total) by mouth daily as needed (if SBP>150). , Disp: , Rfl:   hydrocortisone 2.5 % External Ointment, Apply to the affected areas on face up to twice daily Monday-Friday with flares. Take weekends off., Disp: 28 g, Rfl: 2  metoprolol succinate ER 50 MG Oral Tablet 24 Hr, Take 1 tablet (50 mg total) by mouth Daily Beta Blocker. , Disp: 30 tablet, Rfl: 0  meclizine 12.5 MG Oral Tab, Take 1 tablet (12.5 mg total) by mouth 3 (three) times daily as needed for Dizziness. , Disp: 20 tablet, Rfl: 0  apixaban 5 MG Oral Tab, Take 1 tablet (5 mg total) by mouth 2 (two) times daily. , Disp: , Rfl:   atorvastatin 40 MG Oral Tab, Take 1 tablet (40 mg total) by mouth nightly., Disp: , Rfl:         Social History    Socioeconomic History      Marital status:       Spouse name: Not on file      Number of children: Not on file      Years of education: Not on file      Highest education level: Not on file    Occupational History      Not on file    Tobacco Use      Smoking status: Never      Smokeless tobacco: Never    Vaping Use      Vaping Use: Never used    Substance and Sexual Activity      Alcohol use: Never      Drug use: Never      Sexual activity: Not on file    Other Topics      Concerns:        Grew up on a farm: No        History of tanning: Yes        Outdoor occupation: No        Breast feeding: No        Reaction to local anesthetic: No        Pt has a pacemaker: No        Pt has a defibrillator: No    Social History Narrative      Not on file    Social Determinants of Health  Financial Resource Strain: Not on file  Food Insecurity: No Food Insecurity (10/7/2023)      Food Insecurity          Food Insecurity: Never true  Transportation Needs: No Transportation Needs (10/7/2023)      Transportation Needs          Lack of Transportation: No  Physical Activity: Not on file  Stress: Not on file  Social Connections: Not on file  Housing Stability: [address]  (10/7/2023)      Housing Stability          Housing Instability: No          Housing Instability Emergency: Not on file      Family History   Problem Relation Age of Onset    No Known Problems Father     No Known Problems Mother     Cancer Daughter        A comprehensive 10 point review of systems was completed. Pertinent positives and negatives noted in the the HPI. Physical examination:  GEN:  Pleasant well-developed well-nourished female who is in no acute distress. HEENT: Negative for scleral icterus. Conjunctivae are pink. Neck: Supple without adenopathy or thyromegaly  Chest: Clear to auscultation and percussion  Cardiovascular: Regular S1 and S2  Abdomen: Nondistended.   Bowel sounds are present. There is no tenderness to palpation. There are no masses appreciated. Liver and spleen are not palpable. Extremities: Without edema  Neurologic: Awake, alert and appropriate. Nonfocal examination. Affect:Normal   Skin: No jaundice. Warm and dry. Laboratory data:  Recent Labs   Lab 10/07/23  1555 10/08/23  0707   RBC 4.80 4.74   HGB 13.7 13.6  13.6   HCT 42.5 42.8  42.1   MCV 88.5 90.3   MCH 28.5 28.7   MCHC 32.2 31.8   RDW 14.0 14.0   NEPRELIM 4.97 4.27   WBC 6.7 7.1   .0 207.0       Recent Labs   Lab 10/07/23  1555 10/08/23  0707   * 107*   BUN 16 14   CREATSERUM 1.07* 0.91   CA 9.2 9.0   ALB  --  3.4    143   K 3.7 3.9    110   CO2 26.0 29.0   ALKPHO  --  89   AST  --  21   ALT  --  29   BILT  --  1.0   TP  --  7.0         CT ABDOMEN+PELVIS(CONTRAST ONLY)(CPT=74177)    Result Date: 10/7/2023  CONCLUSION:   Rectal stool impaction. Mild fat stranding around the rectum raises the possibility of stercoral colitis. No obstruction or perforation  Abnormal thickening throughout the endometrium which could be due to hyperplasia or endometrial malignancy. Recommend pelvic ultrasound   Dictated by (CST): Lia Cheatham MD on 10/07/2023 at 5:16 PM     Finalized by (CST): Lia Cheatham MD on 10/07/2023 at 5:22 PM          XR ABDOMEN (1 VIEW) (CPT=74018)    Result Date: 10/6/2023  CONCLUSION:  1. Nonobstructive bowel gas pattern. 2. Moderate colonic stool burden. 3. Lesser incidental findings as above. A preliminary report was issued by the [address] [place] [address] Radiology teleradiology service. There are no major discrepancies. [facility]  Dictated by (CST): Miller Seip, MD on 10/06/2023 at 6:37 AM     Finalized by (CST): Miller Seip, MD on 10/06/2023 at 6:39 AM           Assessment and Plan:    Impression:  Constipation and rectal bleeding:   The patient presents to the hospital with chronic constipation that has been worsening along with rectal bleeding in the setting of systemic anticoagulation with apixaban. Physical examination by the ED has revealed rectal prolapse on each occasion. CT scanning reveals distal rectal thickening. The patient's symptoms are likely due to pelvic floor dysfunction and rectal prolapse. I initially advised enema therapy along with oral MiraLAX. We also discussed the option of a \"bowel washout\" with GoLytely. The patient declined each modality. After returning and discussing her situation with her daughter, she agreed to the MiraLAX. Symptomatic response to be assessed. We discussed that other structural lesions including polyp or neoplasm cannot be excluded. Flexible sigmoidoscopy or colonoscopy could be considered to evaluate the rectum and exclude other entities if the patient would wish to proceed. More aggressive outpatient laxative therapy could include linaclotide. The patient is not a good candidate for pelvic floor therapy or surgical correction of prolapse. Recommendations:  1. Advise oral MiraLAX and enema therapy. The patient initially declined both modalities but agrees to [facility]. 2.  A bowel washout is also an option. 3.  Flexible sigmoidoscopy or colonoscopy could be considered if the patient would wish to proceed. 4.  Outpatient laxative therapy with linaclotide is an option. The patient is not an optimal candidate for surgical therapy. Jg Ramey MD  [facility] Gastroenterology  10/08/23        This note was partially prepared using [facility] voice recognition dictation software. As a result, errors may occur. When identified, these errors have been corrected.  While every attempt is made to correct errors during dictation, discrepancies may still exist.

## 2023-10-08 NOTE — PLAN OF CARE
Patient is alert and oriented X4. Vitals are stable at this time. Patient is tolerating clear liquid diet. Patient given miralax, and encouraged increased fluid intake. No BM during this shift. Pain assessed, denying pain at this time. Patient's daughter at bedside updated on plan of care. Bed low, locked, and all safety measures in place. Patient communicates understanding of fall precautions. Frequent rounding, call light within reach. Problem: Patient Centered Care  Goal: Patient preferences are identified and integrated in the patient's plan of care  Description: Interventions:  - What would you like us to know as we care for you?  From home with daughter   - Provide timely, complete, and accurate information to patient/family  - Incorporate patient and family knowledge, values, beliefs, and cultural backgrounds into the planning and delivery of care  - Encourage patient/family to participate in care and decision-making at the level they choose  - Honor patient and family perspectives and choices  Outcome: Progressing     Problem: GASTROINTESTINAL - ADULT  Goal: Maintains or returns to baseline bowel function  Description: INTERVENTIONS:  - Assess bowel function  - Maintain adequate hydration with IV or PO as ordered and tolerated  - Evaluate effectiveness of GI medications  - Encourage mobilization and activity  - Obtain nutritional consult as needed  - Establish a toileting routine/schedule  - Consider collaborating with pharmacy to review patient's medication profile  Outcome: Progressing     Problem: HEMATOLOGIC - ADULT  Goal: Maintains hematologic stability  Description: INTERVENTIONS  - Assess for signs and symptoms of bleeding or hemorrhage  - Monitor labs and vital signs for trends  - Administer supportive blood products/factors, fluids and medications as ordered and appropriate  - Administer supportive blood products/factors as ordered and appropriate  Outcome: Progressing  Goal: Free from bleeding injury  Description: (Example usage: patient with low platelets)  INTERVENTIONS:  - Avoid intramuscular injections, enemas and rectal medication administration  - Ensure safe mobilization of patient  - Hold pressure on venipuncture sites to achieve adequate hemostasis  - Assess for signs and symptoms of internal bleeding  - Monitor lab trends  - Patient is to report abnormal signs of bleeding to staff  - Avoid use of toothpicks and dental floss  - Use electric shaver for shaving  - Use soft bristle tooth brush  - Limit straining and forceful nose blowing  Outcome: Progressing     Problem: SAFETY ADULT - FALL  Goal: Free from fall injury  Description: INTERVENTIONS:  - Assess pt frequently for physical needs  - Identify cognitive and physical deficits and behaviors that affect risk of falls.   - [place] fall precautions as indicated by assessment.  - Educate pt/family on patient safety including physical limitations  - Instruct pt to call for assistance with activity based on assessment  - Modify environment to reduce risk of injury  - Provide assistive devices as appropriate  - Consider OT/PT consult to assist with strengthening/mobility  - Encourage toileting schedule  Outcome: Progressing     Problem: PAIN - ADULT  Goal: Verbalizes/displays adequate comfort level or patient's stated pain goal  Description: INTERVENTIONS:  - Encourage pt to monitor pain and request assistance  - Assess pain using appropriate pain scale  - Administer analgesics based on type and severity of pain and evaluate response  - Implement non-pharmacological measures as appropriate and evaluate response  - Consider cultural and social influences on pain and pain management  - Manage/alleviate anxiety  - Utilize distraction and/or relaxation techniques  - Monitor for opioid side effects  - Notify MD/LIP if interventions unsuccessful or patient reports new pain  - Anticipate increased pain with activity and pre-medicate as appropriate  Outcome: Progressing     Problem: DISCHARGE PLANNING  Goal: Discharge to home or other facility with appropriate resources  Description: INTERVENTIONS:  - Identify barriers to discharge w/pt and caregiver  - Include patient/family/discharge partner in discharge planning  - Arrange for needed discharge resources and transportation as appropriate  - Identify discharge learning needs (meds, wound care, etc)  - Arrange for interpreters to assist at discharge as needed  - Consider post-discharge preferences of patient/family/discharge partner  - Complete POLST form as appropriate  - Assess patient's ability to be responsible for managing their own health  - Refer to Case Management Department for coordinating discharge planning if the patient needs post-hospital services based on physician/LIP order or complex needs related to functional status, cognitive ability or social support system  Outcome: Progressing

## 2023-10-08 NOTE — PLAN OF CARE
Patient alert/oriented x 4 and on room air. Monitoring vital signs- stable at this time. Blood pressure elevated and MD notified. Monitoring blood glucose levels per order. Pain medication provided as needed. NPO for possible procedure. All pt questions and needs met. Frequent repositioning. Fall precautions maintained- bed alarm on, bed locked in lowest position, call light and personal belongings within reach, non-skid socks in place to bilateral feet. Frequent rounding by nursing staff.      Problem: Patient Centered Care  Goal: Patient preferences are identified and integrated in the patient's plan of care  Description: Interventions:  - What would you like us to know as we care for you?   - Provide timely, complete, and accurate information to patient/family  - Incorporate patient and family knowledge, values, beliefs, and cultural backgrounds into the planning and delivery of care  - Encourage patient/family to participate in care and decision-making at the level they choose  - Honor patient and family perspectives and choices  Outcome: Progressing

## 2023-10-09 LAB
BASOPHILS # BLD AUTO: 0.05 X10(3) UL (ref 0–0.2)
BASOPHILS NFR BLD AUTO: 0.6 %
DEPRECATED RDW RBC AUTO: 45.8 FL (ref 35.1–46.3)
EOSINOPHIL # BLD AUTO: 0.16 X10(3) UL (ref 0–0.7)
EOSINOPHIL NFR BLD AUTO: 1.9 %
ERYTHROCYTE [DISTWIDTH] IN BLOOD BY AUTOMATED COUNT: 14 % (ref 11–15)
GLUCOSE BLDC GLUCOMTR-MCNC: 108 MG/DL (ref 70–99)
GLUCOSE BLDC GLUCOMTR-MCNC: 130 MG/DL (ref 70–99)
GLUCOSE BLDC GLUCOMTR-MCNC: 158 MG/DL (ref 70–99)
GLUCOSE BLDC GLUCOMTR-MCNC: 91 MG/DL (ref 70–99)
HCT VFR BLD AUTO: 42.8 %
HCT VFR BLD AUTO: 43.1 %
HCT VFR BLD AUTO: 44.5 %
HGB BLD-MCNC: 13.7 G/DL
HGB BLD-MCNC: 13.8 G/DL
HGB BLD-MCNC: 14.2 G/DL
IMM GRANULOCYTES # BLD AUTO: 0.02 X10(3) UL (ref 0–1)
IMM GRANULOCYTES NFR BLD: 0.2 %
LYMPHOCYTES # BLD AUTO: 1.97 X10(3) UL (ref 1–4)
LYMPHOCYTES NFR BLD AUTO: 23.7 %
MCH RBC QN AUTO: 28.8 PG (ref 26–34)
MCHC RBC AUTO-ENTMCNC: 32 G/DL (ref 31–37)
MCV RBC AUTO: 89.8 FL
MONOCYTES # BLD AUTO: 0.72 X10(3) UL (ref 0.1–1)
MONOCYTES NFR BLD AUTO: 8.7 %
NEUTROPHILS # BLD AUTO: 5.38 X10 (3) UL (ref 1.5–7.7)
NEUTROPHILS # BLD AUTO: 5.38 X10(3) UL (ref 1.5–7.7)
NEUTROPHILS NFR BLD AUTO: 64.9 %
PLATELET # BLD AUTO: 209 10(3)UL (ref 150–450)
RBC # BLD AUTO: 4.8 X10(6)UL
WBC # BLD AUTO: 8.3 X10(3) UL (ref 4–11)

## 2023-10-09 PROCEDURE — 99232 SBSQ HOSP IP/OBS MODERATE 35: CPT | Performed by: INTERNAL MEDICINE

## 2023-10-09 PROCEDURE — 99232 SBSQ HOSP IP/OBS MODERATE 35: CPT | Performed by: REGISTERED NURSE

## 2023-10-09 NOTE — PLAN OF CARE
Patient is alert and oriented times four with occasional confusion. Patient had tap water enema in A.M. Patient had extra large bowel movement today. Bed is in the lowest position  and call light is within reach.       Problem: Patient Centered Care  Goal: Patient preferences are identified and integrated in the patient's plan of care  Description: Interventions:  - What would you like us to know as we care for you?   - Provide timely, complete, and accurate information to patient/family  - Incorporate patient and family knowledge, values, beliefs, and cultural backgrounds into the planning and delivery of care  - Encourage patient/family to participate in care and decision-making at the level they choose  - Honor patient and family perspectives and choices  Outcome: Progressing     Problem: GASTROINTESTINAL - ADULT  Goal: Maintains or returns to baseline bowel function  Description: INTERVENTIONS:  - Assess bowel function  - Maintain adequate hydration with IV or PO as ordered and tolerated  - Evaluate effectiveness of GI medications  - Encourage mobilization and activity  - Obtain nutritional consult as needed  - Establish a toileting routine/schedule  - Consider collaborating with pharmacy to review patient's medication profile  Outcome: Progressing     Problem: HEMATOLOGIC - ADULT  Goal: Maintains hematologic stability  Description: INTERVENTIONS  - Assess for signs and symptoms of bleeding or hemorrhage  - Monitor labs and vital signs for trends  - Administer supportive blood products/factors, fluids and medications as ordered and appropriate  - Administer supportive blood products/factors as ordered and appropriate  Outcome: Progressing  Goal: Free from bleeding injury  Description: (Example usage: patient with low platelets)  INTERVENTIONS:  - Avoid intramuscular injections, enemas and rectal medication administration  - Ensure safe mobilization of patient  - Hold pressure on venipuncture sites to achieve adequate hemostasis  - Assess for signs and symptoms of internal bleeding  - Monitor lab trends  - Patient is to report abnormal signs of bleeding to staff  - Avoid use of toothpicks and dental floss  - Use electric shaver for shaving  - Use soft bristle tooth brush  - Limit straining and forceful nose blowing  Outcome: Progressing     Problem: SAFETY ADULT - FALL  Goal: Free from fall injury  Description: INTERVENTIONS:  - Assess pt frequently for physical needs  - Identify cognitive and physical deficits and behaviors that affect risk of falls.   - [place] fall precautions as indicated by assessment.  - Educate pt/family on patient safety including physical limitations  - Instruct pt to call for assistance with activity based on assessment  - Modify environment to reduce risk of injury  - Provide assistive devices as appropriate  - Consider OT/PT consult to assist with strengthening/mobility  - Encourage toileting schedule  Outcome: Progressing     Problem: PAIN - ADULT  Goal: Verbalizes/displays adequate comfort level or patient's stated pain goal  Description: INTERVENTIONS:  - Encourage pt to monitor pain and request assistance  - Assess pain using appropriate pain scale  - Administer analgesics based on type and severity of pain and evaluate response  - Implement non-pharmacological measures as appropriate and evaluate response  - Consider cultural and social influences on pain and pain management  - Manage/alleviate anxiety  - Utilize distraction and/or relaxation techniques  - Monitor for opioid side effects  - Notify MD/LIP if interventions unsuccessful or patient reports new pain  - Anticipate increased pain with activity and pre-medicate as appropriate  Outcome: Progressing     Problem: DISCHARGE PLANNING  Goal: Discharge to home or other facility with appropriate resources  Description: INTERVENTIONS:  - Identify barriers to discharge w/pt and caregiver  - Include patient/family/discharge partner in discharge planning  - Arrange for needed discharge resources and transportation as appropriate  - Identify discharge learning needs (meds, wound care, etc)  - Arrange for interpreters to assist at discharge as needed  - Consider post-discharge preferences of patient/family/discharge partner  - Complete POLST form as appropriate  - Assess patient's ability to be responsible for managing their own health  - Refer to Case Management Department for coordinating discharge planning if the patient needs post-hospital services based on physician/LIP order or complex needs related to functional status, cognitive ability or social support system  Outcome: Progressing

## 2023-10-09 NOTE — PHYSICAL THERAPY NOTE
PHYSICAL THERAPY EVALUATION - INPATIENT     Room Number: 548/548-A  Evaluation Date: 10/9/2023  Type of Evaluation: Initial   Physician Order: PT Eval and Treat    Presenting Problem: GI bleed  Co-Morbidities : diabetes  Reason for Therapy: Mobility Dysfunction and Discharge Planning    PHYSICAL THERAPY ASSESSMENT     Patient is a 80year old female admitted 10/7/2023 for GI bleed. Patient's current functional deficits include impaired tolerance toward activity due to fatigue, impaired bed mobility, transfers, ambulation, balance, strength and stair negotiation, which are below the patient's pre-admission status. Patient received supine in bed with dtr present. RN approved activity. Coordinated session with OT. Therapist educated pt on POC and physiological benefits of mobilization. Patient agreeable to participate. Denies pain. Follows commands with increased time. Bed mobility: Min supine>sit. Task requiring increased time to complete. Sat EOB for 8 minutes requiring SBA for balance. Transfers: Mod A x 1 with use of RW. Cues for appropriate UE positioning with transitional movements. Initial posterior loss of balance requiring cues for anterior weight dispersion. Min/Mod to maintain static standing with bilateral UE support on RW. Ambulation: Mod with use of RW. 5ft bed>chair. Intermittent posterior lean. Assist with management of RW with turns. Patient in chair at end of session with needs in reach and alarm activated. The patient's Approx Degree of Impairment: 61.29% has been calculated based on documentation in the [facility] '6 clicks' Inpatient Basic Mobility Short Form. Research supports that patients with this level of impairment may benefit from rehab, however, pt functioning near baseline levels and has 24 hour supervision/care assist from dtr at home. Plan for HHPT/OT and continued 24 hour supervision/care.     Patient will benefit from continued IP PT services to address these deficits in preparation for discharge. DISCHARGE RECOMMENDATIONS  PT Discharge Recommendations: Home with home health PT/OT;24 hour care/supervision    PLAN  PT Treatment Plan: Bed mobility; Body mechanics; Endurance; Patient education; Energy conservation; Family education;Gait training;Range of motion;Strengthening;Transfer training;Balance training;Stoop training;Stair training             PHYSICAL THERAPY MEDICAL/SOCIAL HISTORY       Problem List  Principal Problem:    Stercoral colitis  Active Problems: Aortic valve stenosis    Atrial fibrillation with rapid ventricular response (HCC)    Legal blindness    Stage 2 chronic kidney disease    Type 2 diabetes mellitus (HCC)    Essential hypertension, benign    Impacted stool in rectum (HCC)    Rectal bleeding      HOME SITUATION  Home Situation  Type of Home: House  Home Layout: One level  Stairs to Enter : 2  Railing: Yes  Lives With: Daughter (JACQUELYN)  Drives: No  Patient Owned Equipment: Rolling walker  Patient Regularly Uses: None     Prior Level of [place]: Patient requiring assist for all ADL's and IADL's from dtr. Ambulates short distances with RW and dtr assist. Denies recent falls.      SUBJECTIVE  \"I know I'll be in pain when they give me medicine to make me go\"     PHYSICAL THERAPY EXAMINATION     OBJECTIVE  Precautions: Low vision;Hard of hearing;Bed/chair alarm  Fall Risk: High fall risk      PAIN ASSESSMENT  [place]          COGNITION  Overall Cognitive Status:  WFL - within functional limits  Following Commands:  follows multistep commands with increased time    RANGE OF MOTION AND STRENGTH ASSESSMENT  Upper extremity ROM and strength are within functional limits   Lower extremity ROM is within functional limits   Lower extremity strength is impaired bilateral LE's: 3+/5    BALANCE  Static Sitting: Fair  Dynamic Sitting: Fair -  Static Standing: Poor +  Dynamic Standing: Poor    ACTIVITY TOLERANCE  Pulse: 60  Heart Rate Source: Monitor     BP: 153/71  BP Location: Right arm  BP Method: Automatic  Patient Position: Sitting    O2 WALK  Oxygen Therapy  SPO2% on Room Air at Rest: 96    AM-PAC '6-Clicks' INPATIENT SHORT FORM - BASIC MOBILITY  How much difficulty does the patient currently have. .. Patient Difficulty: Turning over in bed (including adjusting bedclothes, sheets and blankets)?: A Little   Patient Difficulty: Sitting down on and standing up from a chair with arms (e.g., wheelchair, bedside commode, etc.): A Lot   Patient Difficulty: Moving from lying on back to sitting on the side of the bed?: A Little   How much help from another person does the patient currently need. .. Help from Another: Moving to and from a bed to a chair (including a wheelchair)?: A Lot   Help from Another: Need to walk in hospital room?: A Lot   Help from Another: Climbing 3-5 steps with a railing?: A Lot     AM-PAC Score:  Raw Score: 14   Approx Degree of Impairment: 61.29%   Standardized Score (AM-PAC Scale): 38.1   CMS Modifier (G-Code): CL    FUNCTIONAL ABILITY STATUS  Functional Mobility/Gait Assessment  Gait Assistance: Moderate assistance  Distance (ft): 5ft  Assistive Device: Rolling walker  Pattern: Shuffle (posterior lean)      Exercise/Education Provided:  Bed mobility  Body mechanics  Energy conservation  Functional activity tolerated  Gait training  Posture  Strengthening  Transfer training    Patient End of Session: Up in chair;Needs met;RN aware of session/findings;Call light within reach; Alarm set; Family present    CURRENT GOALS    Goals to be met by: 10/23/23  Patient Goal Patient's self-stated goal is: return to PLOF   Goal #1 Patient is able to demonstrate supine - sit EOB @ level: supervision     Goal #1   Current Status    Goal #2 Patient is able to demonstrate transfers EOB to/from Chair/Wheelchair at assistance level: CG with walker - rolling     Goal #2  Current Status    Goal #3 Patient is able to ambulate 50 feet with assist device: walker - rolling at assistance level: CG   Goal #3   Current Status    Goal #4 Patient will negotiate 2 stairs/one curb w/ assistive device and Min   Goal #4   Current Status    Goal #5 Patient to demonstrate independence with home activity/exercise instructions provided to patient in preparation for discharge.    Goal #5   Current Status      Patient Evaluation Complexity Level:  History Moderate - 1 or 2 personal factors and/or co-morbidities   Examination of body systems Moderate - addressing a total of 3 or more elements   Clinical Presentation Moderate - Evolving   Clinical Decision Making Moderate Complexity     Therapeutic Activity: 15 minutes

## 2023-10-09 NOTE — PLAN OF CARE
Alert and oriented x 4 and on room air. Monitoring vital signs. Blood pressure elevated and PRN Bp med provided with relief. Monitoring blood glucose levels per order. Pt refuse sliding scale bedtime insuline and MD notified. Pt was confused at some point. Pt reoriented to time, place and situation. All pt questions and needs met. Fall precautions maintained- bed alarm on, bed locked in lowest position, call light and personal belongings within reach, non-skid socks in place to bilateral feet. Frequent rounding by nursing staff.       Problem: Patient Centered Care  Goal: Patient preferences are identified and integrated in the patient's plan of care  Description: Interventions:  - What would you like us to know as we care for you?   - Provide timely, complete, and accurate information to patient/family  - Incorporate patient and family knowledge, values, beliefs, and cultural backgrounds into the planning and delivery of care  - Encourage patient/family to participate in care and decision-making at the level they choose  - Honor patient and family perspectives and choices  Outcome: Progressing     Problem: GASTROINTESTINAL - ADULT  Goal: Maintains or returns to baseline bowel function  Description: INTERVENTIONS:  - Assess bowel function  - Maintain adequate hydration with IV or PO as ordered and tolerated  - Evaluate effectiveness of GI medications  - Encourage mobilization and activity  - Obtain nutritional consult as needed  - Establish a toileting routine/schedule  - Consider collaborating with pharmacy to review patient's medication profile  Outcome: Not Progressing     Problem: HEMATOLOGIC - ADULT  Goal: Maintains hematologic stability  Description: INTERVENTIONS  - Assess for signs and symptoms of bleeding or hemorrhage  - Monitor labs and vital signs for trends  - Administer supportive blood products/factors, fluids and medications as ordered and appropriate  - Administer supportive blood products/factors as ordered and appropriate  Outcome: Not Progressing  Goal: Free from bleeding injury  Description: (Example usage: patient with low platelets)  INTERVENTIONS:  - Avoid intramuscular injections, enemas and rectal medication administration  - Ensure safe mobilization of patient  - Hold pressure on venipuncture sites to achieve adequate hemostasis  - Assess for signs and symptoms of internal bleeding  - Monitor lab trends  - Patient is to report abnormal signs of bleeding to staff  - Avoid use of toothpicks and dental floss  - Use electric shaver for shaving  - Use soft bristle tooth brush  - Limit straining and forceful nose blowing  Outcome: Progressing     Problem: SAFETY ADULT - FALL  Goal: Free from fall injury  Description: INTERVENTIONS:  - Assess pt frequently for physical needs  - Identify cognitive and physical deficits and behaviors that affect risk of falls.   - [place] fall precautions as indicated by assessment.  - Educate pt/family on patient safety including physical limitations  - Instruct pt to call for assistance with activity based on assessment  - Modify environment to reduce risk of injury  - Provide assistive devices as appropriate  - Consider OT/PT consult to assist with strengthening/mobility  - Encourage toileting schedule  Outcome: Progressing     Problem: PAIN - ADULT  Goal: Verbalizes/displays adequate comfort level or patient's stated pain goal  Description: INTERVENTIONS:  - Encourage pt to monitor pain and request assistance  - Assess pain using appropriate pain scale  - Administer analgesics based on type and severity of pain and evaluate response  - Implement non-pharmacological measures as appropriate and evaluate response  - Consider cultural and social influences on pain and pain management  - Manage/alleviate anxiety  - Utilize distraction and/or relaxation techniques  - Monitor for opioid side effects  - Notify MD/LIP if interventions unsuccessful or patient reports new pain  - Anticipate increased pain with activity and pre-medicate as appropriate  Outcome: Progressing     Problem: DISCHARGE PLANNING  Goal: Discharge to home or other facility with appropriate resources  Description: INTERVENTIONS:  - Identify barriers to discharge w/pt and caregiver  - Include patient/family/discharge partner in discharge planning  - Arrange for needed discharge resources and transportation as appropriate  - Identify discharge learning needs (meds, wound care, etc)  - Arrange for interpreters to assist at discharge as needed  - Consider post-discharge preferences of patient/family/discharge partner  - Complete POLST form as appropriate  - Assess patient's ability to be responsible for managing their own health  - Refer to Case Management Department for coordinating discharge planning if the patient needs post-hospital services based on physician/LIP order or complex needs related to functional status, cognitive ability or social support system  Outcome: Progressing

## 2023-10-10 VITALS
HEIGHT: 62 IN | DIASTOLIC BLOOD PRESSURE: 59 MMHG | HEART RATE: 54 BPM | OXYGEN SATURATION: 97 % | TEMPERATURE: 98 F | SYSTOLIC BLOOD PRESSURE: 110 MMHG | WEIGHT: 159.69 LBS | BODY MASS INDEX: 29.38 KG/M2 | RESPIRATION RATE: 14 BRPM

## 2023-10-10 LAB
BASOPHILS # BLD AUTO: 0.05 X10(3) UL (ref 0–0.2)
BASOPHILS NFR BLD AUTO: 0.7 %
DEPRECATED RDW RBC AUTO: 44.9 FL (ref 35.1–46.3)
EOSINOPHIL # BLD AUTO: 0.19 X10(3) UL (ref 0–0.7)
EOSINOPHIL NFR BLD AUTO: 2.8 %
ERYTHROCYTE [DISTWIDTH] IN BLOOD BY AUTOMATED COUNT: 13.9 % (ref 11–15)
GLUCOSE BLDC GLUCOMTR-MCNC: 101 MG/DL (ref 70–99)
GLUCOSE BLDC GLUCOMTR-MCNC: 201 MG/DL (ref 70–99)
HCT VFR BLD AUTO: 41 %
HCT VFR BLD AUTO: 43 %
HCT VFR BLD AUTO: 44.9 %
HGB BLD-MCNC: 13.1 G/DL
HGB BLD-MCNC: 14 G/DL
HGB BLD-MCNC: 14.4 G/DL
IMM GRANULOCYTES # BLD AUTO: 0.02 X10(3) UL (ref 0–1)
IMM GRANULOCYTES NFR BLD: 0.3 %
LYMPHOCYTES # BLD AUTO: 2.21 X10(3) UL (ref 1–4)
LYMPHOCYTES NFR BLD AUTO: 33.1 %
MCH RBC QN AUTO: 28.9 PG (ref 26–34)
MCHC RBC AUTO-ENTMCNC: 32.6 G/DL (ref 31–37)
MCV RBC AUTO: 88.7 FL
MONOCYTES # BLD AUTO: 0.56 X10(3) UL (ref 0.1–1)
MONOCYTES NFR BLD AUTO: 8.4 %
NEUTROPHILS # BLD AUTO: 3.64 X10 (3) UL (ref 1.5–7.7)
NEUTROPHILS # BLD AUTO: 3.64 X10(3) UL (ref 1.5–7.7)
NEUTROPHILS NFR BLD AUTO: 54.7 %
PLATELET # BLD AUTO: 140 10(3)UL (ref 150–450)
RBC # BLD AUTO: 4.85 X10(6)UL
WBC # BLD AUTO: 6.7 X10(3) UL (ref 4–11)

## 2023-10-10 PROCEDURE — 99239 HOSP IP/OBS DSCHRG MGMT >30: CPT | Performed by: INTERNAL MEDICINE

## 2023-10-10 PROCEDURE — 99233 SBSQ HOSP IP/OBS HIGH 50: CPT | Performed by: NURSE PRACTITIONER

## 2023-10-10 NOTE — PLAN OF CARE
Pt a&o x4/confused at times, room air, on tele, rolling walker x1. Elevated blood pressure during morning rounds, PRN given. Plan is discharge home with home health. Discharge education given and daughter updated on discharge. Safety precautions maintained. Call light in reach. Problem: Patient Centered Care  Goal: Patient preferences are identified and integrated in the patient's plan of care  Description: Interventions:  - What would you like us to know as we care for you?  From home with daughter  - Provide timely, complete, and accurate information to patient/family  - Incorporate patient and family knowledge, values, beliefs, and cultural backgrounds into the planning and delivery of care  - Encourage patient/family to participate in care and decision-making at the level they choose  - Honor patient and family perspectives and choices  Outcome: Progressing     Problem: GASTROINTESTINAL - ADULT  Goal: Maintains or returns to baseline bowel function  Description: INTERVENTIONS:  - Assess bowel function  - Maintain adequate hydration with IV or PO as ordered and tolerated  - Evaluate effectiveness of GI medications  - Encourage mobilization and activity  - Obtain nutritional consult as needed  - Establish a toileting routine/schedule  - Consider collaborating with pharmacy to review patient's medication profile  Outcome: Progressing     Problem: HEMATOLOGIC - ADULT  Goal: Maintains hematologic stability  Description: INTERVENTIONS  - Assess for signs and symptoms of bleeding or hemorrhage  - Monitor labs and vital signs for trends  - Administer supportive blood products/factors, fluids and medications as ordered and appropriate  - Administer supportive blood products/factors as ordered and appropriate  Outcome: Progressing  Goal: Free from bleeding injury  Description: (Example usage: patient with low platelets)  INTERVENTIONS:  - Avoid intramuscular injections, enemas and rectal medication administration  - Ensure safe mobilization of patient  - Hold pressure on venipuncture sites to achieve adequate hemostasis  - Assess for signs and symptoms of internal bleeding  - Monitor lab trends  - Patient is to report abnormal signs of bleeding to staff  - Avoid use of toothpicks and dental floss  - Use electric shaver for shaving  - Use soft bristle tooth brush  - Limit straining and forceful nose blowing  Outcome: Progressing     Problem: SAFETY ADULT - FALL  Goal: Free from fall injury  Description: INTERVENTIONS:  - Assess pt frequently for physical needs  - Identify cognitive and physical deficits and behaviors that affect risk of falls.   - [place] fall precautions as indicated by assessment.  - Educate pt/family on patient safety including physical limitations  - Instruct pt to call for assistance with activity based on assessment  - Modify environment to reduce risk of injury  - Provide assistive devices as appropriate  - Consider OT/PT consult to assist with strengthening/mobility  - Encourage toileting schedule  Outcome: Progressing     Problem: PAIN - ADULT  Goal: Verbalizes/displays adequate comfort level or patient's stated pain goal  Description: INTERVENTIONS:  - Encourage pt to monitor pain and request assistance  - Assess pain using appropriate pain scale  - Administer analgesics based on type and severity of pain and evaluate response  - Implement non-pharmacological measures as appropriate and evaluate response  - Consider cultural and social influences on pain and pain management  - Manage/alleviate anxiety  - Utilize distraction and/or relaxation techniques  - Monitor for opioid side effects  - Notify MD/LIP if interventions unsuccessful or patient reports new pain  - Anticipate increased pain with activity and pre-medicate as appropriate  Outcome: Progressing     Problem: DISCHARGE PLANNING  Goal: Discharge to home or other facility with appropriate resources  Description: INTERVENTIONS:  - Identify barriers to discharge w/pt and caregiver  - Include patient/family/discharge partner in discharge planning  - Arrange for needed discharge resources and transportation as appropriate  - Identify discharge learning needs (meds, wound care, etc)  - Arrange for interpreters to assist at discharge as needed  - Consider post-discharge preferences of patient/family/discharge partner  - Complete POLST form as appropriate  - Assess patient's ability to be responsible for managing their own health  - Refer to Case Management Department for coordinating discharge planning if the patient needs post-hospital services based on physician/LIP order or complex needs related to functional status, cognitive ability or social support system  Outcome: Progressing

## 2023-10-10 NOTE — CM/SW NOTE
Department  asked to send updates to [facility] referral for [facility]. Assigned SW/CM to follow up with patient/family on discharge plan.      Yared Jimenez   , [facility]

## 2023-10-10 NOTE — CM/SW NOTE
10/10/23 1300   Discharge disposition   Expected discharge disposition Home-Health   Post Acute Care Provider   (Eliot Berrios)   Discharge transportation Private car     CM sent secure message in aidin to [facility] of pt discharging today. HH contact entered in AVS by CM. DC planner/CM to remain available for support and/or discharge planning.     Rajinder Haney RN, [facility]    Ext.  [phone]

## 2023-10-10 NOTE — PLAN OF CARE
Patient personal belongings and call light within reach. Safety measures in place. No needs at the time. Problem: Patient Centered Care  Goal: Patient preferences are identified and integrated in the patient's plan of care  Description: Interventions:  - What would you like us to know as we care for you?  From home with daughter  - Provide timely, complete, and accurate information to patient/family  - Incorporate patient and family knowledge, values, beliefs, and cultural backgrounds into the planning and delivery of care  - Encourage patient/family to participate in care and decision-making at the level they choose  - Honor patient and family perspectives and choices  Outcome: Progressing     Problem: GASTROINTESTINAL - ADULT  Goal: Maintains or returns to baseline bowel function  Description: INTERVENTIONS:  - Assess bowel function  - Maintain adequate hydration with IV or PO as ordered and tolerated  - Evaluate effectiveness of GI medications  - Encourage mobilization and activity  - Obtain nutritional consult as needed  - Establish a toileting routine/schedule  - Consider collaborating with pharmacy to review patient's medication profile  Outcome: Progressing     Problem: HEMATOLOGIC - ADULT  Goal: Maintains hematologic stability  Description: INTERVENTIONS  - Assess for signs and symptoms of bleeding or hemorrhage  - Monitor labs and vital signs for trends  - Administer supportive blood products/factors, fluids and medications as ordered and appropriate  - Administer supportive blood products/factors as ordered and appropriate  Outcome: Progressing  Goal: Free from bleeding injury  Description: (Example usage: patient with low platelets)  INTERVENTIONS:  - Avoid intramuscular injections, enemas and rectal medication administration  - Ensure safe mobilization of patient  - Hold pressure on venipuncture sites to achieve adequate hemostasis  - Assess for signs and symptoms of internal bleeding  - Monitor lab trends  - Patient is to report abnormal signs of bleeding to staff  - Avoid use of toothpicks and dental floss  - Use electric shaver for shaving  - Use soft bristle tooth brush  - Limit straining and forceful nose blowing  Outcome: Progressing     Problem: SAFETY ADULT - FALL  Goal: Free from fall injury  Description: INTERVENTIONS:  - Assess pt frequently for physical needs  - Identify cognitive and physical deficits and behaviors that affect risk of falls.   - [place] fall precautions as indicated by assessment.  - Educate pt/family on patient safety including physical limitations  - Instruct pt to call for assistance with activity based on assessment  - Modify environment to reduce risk of injury  - Provide assistive devices as appropriate  - Consider OT/PT consult to assist with strengthening/mobility  - Encourage toileting schedule  Outcome: Progressing     Problem: PAIN - ADULT  Goal: Verbalizes/displays adequate comfort level or patient's stated pain goal  Description: INTERVENTIONS:  - Encourage pt to monitor pain and request assistance  - Assess pain using appropriate pain scale  - Administer analgesics based on type and severity of pain and evaluate response  - Implement non-pharmacological measures as appropriate and evaluate response  - Consider cultural and social influences on pain and pain management  - Manage/alleviate anxiety  - Utilize distraction and/or relaxation techniques  - Monitor for opioid side effects  - Notify MD/LIP if interventions unsuccessful or patient reports new pain  - Anticipate increased pain with activity and pre-medicate as appropriate  Outcome: Progressing     Problem: DISCHARGE PLANNING  Goal: Discharge to home or other facility with appropriate resources  Description: INTERVENTIONS:  - Identify barriers to discharge w/pt and caregiver  - Include patient/family/discharge partner in discharge planning  - Arrange for needed discharge resources and transportation as appropriate  - Identify discharge learning needs (meds, wound care, etc)  - Arrange for interpreters to assist at discharge as needed  - Consider post-discharge preferences of patient/family/discharge partner  - Complete POLST form as appropriate  - Assess patient's ability to be responsible for managing their own health  - Refer to Case Management Department for coordinating discharge planning if the patient needs post-hospital services based on physician/LIP order or complex needs related to functional status, cognitive ability or social support system  Outcome: Progressing

## 2023-10-10 NOTE — DISCHARGE INSTRUCTIONS
Sometimes managing your health at home requires assistance. The [facility] team has recognized your preference to use. A representative from the home health agency will contact you or your family to schedule your first visit.        [facility]  [address] [place] [address], [address], [address] [place] [address] [place], [address] [place] [address]  Phone: [phone]  Fax: [phone]

## 2023-10-11 ENCOUNTER — PATIENT OUTREACH (OUTPATIENT)
Dept: CASE MANAGEMENT | Age: 88
End: 2023-10-11

## 2023-10-11 ENCOUNTER — TELEPHONE (OUTPATIENT)
Dept: INTERNAL MEDICINE CLINIC | Facility: CLINIC | Age: 88
End: 2023-10-11

## 2023-10-11 DIAGNOSIS — Z02.9 ENCOUNTERS FOR UNSPECIFIED ADMINISTRATIVE PURPOSE: Primary | ICD-10-CM

## 2023-10-11 PROCEDURE — 1111F DSCHRG MED/CURRENT MED MERGE: CPT

## 2023-10-11 NOTE — PAYOR COMM NOTE
Discharge Notification    Patient Name: Katelynn Bunch: [facility] #: [id number]  Authorization Number: [id number]  Admit Date/Time: 10/7/2023 3:46 PM  Discharge Date/Time: 10/10/2023 1:24 PM

## 2023-10-11 NOTE — PROGRESS NOTES
Left message on mailbox for pt/dtr Riddhi (SWATHI verified) to call [facility] back for TCM. [facility] contact information [phone] included in message. Discharge Dx:     Stercoral colitis    Follow up appointments: Follow-up Information    Follow up With Specialties Details Why Contact Info   Mia Shah MD Internal Medicine Schedule an appointment as soon as possible for a visit in 1 week(s)  [address] [facility] [id number] [phone]

## 2023-10-11 NOTE — PROGRESS NOTES
Left message on mailbox for pt/dtr Riddhi (SWATHI verified) to call NCM back for TCM. NCM contact information included in message. Sent TE to office, as pt high risk for readmission.       Follow-Ups: Schedule an appointment with Elbert Rizo MD (Internal Medicine) in 1 week (10/10/2023)

## 2023-10-11 NOTE — TELEPHONE ENCOUNTER
Left message on mailbox for dtrRiddhi (SWATHI verified) to call NCM back for TCM--second attempt. Pt does not have an appointment scheduled at this time. TCM appt recommended by 10/17/2023, as pt is a high risk for readmission. Please advise. BOOK BY DATE (last date for TCM): 10/24/2023    Please f/u with pt/dtr and try to get them to schedule as pt would greatly benefit from TCM appt. Thank you!          Follow-Ups: Schedule an appointment with Evita Jimenez MD (Internal Medicine) in 1 week (10/10/2023)

## 2023-10-16 ENCOUNTER — OFFICE VISIT (OUTPATIENT)
Dept: INTERNAL MEDICINE CLINIC | Facility: CLINIC | Age: 88
End: 2023-10-16

## 2023-10-16 VITALS
HEART RATE: 55 BPM | BODY MASS INDEX: 31.61 KG/M2 | OXYGEN SATURATION: 97 % | WEIGHT: 161 LBS | HEIGHT: 60 IN | DIASTOLIC BLOOD PRESSURE: 85 MMHG | TEMPERATURE: 97 F | SYSTOLIC BLOOD PRESSURE: 135 MMHG

## 2023-10-16 DIAGNOSIS — K52.89 STERCORAL COLITIS: Primary | ICD-10-CM

## 2023-10-16 PROBLEM — D69.6 THROMBOCYTOPENIA: Chronic | Status: ACTIVE | Noted: 2023-10-16

## 2023-10-16 PROBLEM — D69.6 THROMBOCYTOPENIA (HCC): Chronic | Status: ACTIVE | Noted: 2023-10-16

## 2023-10-16 NOTE — PROGRESS NOTES
Multiple attempts to reach pt and messages left with no return call. Patient went in for HFU appt with PCP on 10/16/23. Encounter closing.

## 2023-10-23 ENCOUNTER — TELEPHONE (OUTPATIENT)
Dept: INTERNAL MEDICINE CLINIC | Facility: CLINIC | Age: 88
End: 2023-10-23

## 2023-11-01 ENCOUNTER — MED REC SCAN ONLY (OUTPATIENT)
Dept: INTERNAL MEDICINE CLINIC | Facility: CLINIC | Age: 88
End: 2023-11-01

## 2023-12-06 ENCOUNTER — MED REC SCAN ONLY (OUTPATIENT)
Dept: INTERNAL MEDICINE CLINIC | Facility: CLINIC | Age: 88
End: 2023-12-06

## 2023-12-12 ENCOUNTER — NURSE TRIAGE (OUTPATIENT)
Dept: INTERNAL MEDICINE CLINIC | Facility: CLINIC | Age: 88
End: 2023-12-12

## 2023-12-12 NOTE — TELEPHONE ENCOUNTER
Action Requested: Summary for Provider     []  Critical Lab, Recommendations Needed  [] Need Additional Advice  [x]   FYI    []   Need Orders  [] Need Medications Sent to Pharmacy  []  Other     SUMMARY:   Spoke with pt's daughter per SWATHI, YAMILETH verified, pt has no BM for 3-4 days. and when pt pushes she got bld, pt on Eliquis. Pt's daughter gave pt metamucil today and takes colace every day but the last 4 days pt didn't have any prune juice. Pt was in hospital before for constipation, she got dulcolax chewable from pharm. Pt has no abd pain, vomiting, denies no recent bld in stool, no other sx. She wants to know if pt can have dulcolax with metamucil? This RN advised, pt can  take both and to push fluid. Pt tried dulcolax suppository but no help   She also req rx ref for pt rash under her bilateral breast due to sweating, last ref  given by hospital MD, rx is Miconazole nitrate powder 2% BID 3 oz. She was offered an appt but declined   She was advised to continue monitor pt sx, if sx persist or gets worse to go to ER/ IC, she agreed and stated understanding. pls advise, thanks in advance. Reason for call: constipation  Onset: 3-4 days            Reason for Disposition   Unable to have a bowel movement (BM) without using a laxative, suppository, or enema    Protocols used: Constipation-A-OH    Requested Prescriptions     Pending Prescriptions Disp Refills    miconazole 2 % External Powder 3 each 3     Sig: Apply topically as needed for Itching. Last Office Visit with PCP: 10/16/2023  .

## 2023-12-13 ENCOUNTER — HOSPITAL ENCOUNTER (EMERGENCY)
Facility: HOSPITAL | Age: 88
Discharge: HOME | End: 2023-12-13
Attending: EMERGENCY MEDICINE
Payer: MEDICARE

## 2023-12-13 VITALS
HEIGHT: 62 IN | TEMPERATURE: 98 F | WEIGHT: 158.75 LBS | DIASTOLIC BLOOD PRESSURE: 87 MMHG | RESPIRATION RATE: 23 BRPM | HEART RATE: 69 BPM | SYSTOLIC BLOOD PRESSURE: 155 MMHG | BODY MASS INDEX: 29.21 KG/M2 | OXYGEN SATURATION: 97 %

## 2023-12-13 DIAGNOSIS — K56.41 FECAL IMPACTION (HCC): Primary | ICD-10-CM

## 2023-12-13 DIAGNOSIS — K62.5 RECTAL BLEEDING: ICD-10-CM

## 2023-12-13 PROCEDURE — 99284 EMERGENCY DEPT VISIT MOD MDM: CPT

## 2023-12-13 PROCEDURE — 99283 EMERGENCY DEPT VISIT LOW MDM: CPT

## 2023-12-13 NOTE — TELEPHONE ENCOUNTER
The daughter was called and informed. She stated she does not know how she will get the patient to the ED.   I stated to call [facility] emergency number to see if they can help

## 2023-12-13 NOTE — TELEPHONE ENCOUNTER
Clementina Pereira Pt daughter is calling today as pt still has not had a BM today. Daughter has been given her the colace every day the prune juice was started again yesterday. Pt does not have any abd pain, no vomiting. Daughter stated that she tried to give her the Dulcolax suppository and she inserted it while pt was on the toilet and daughter feels that joseph of it came out. Pt inform that the suppository needs to be inserted why pt is in bed on her let side with her right knee slightly bent and pt has to keep this position for 15-20 min or when she feels a strong urge to have a BM. Daughter is wanting to know if there is something that you can prescribed pt or is it ok for her to try another suppository in 1 hr? Daughter stated that she has a hard time with moving pt so she is not sure if she can take her to I/C. Inform daughter  if she does not have a BM and she can not take her to I/C then she will have to call the  ambulance. I did inform daughter to try to move pt legs gentle up and down to stimulate. Daughter will try this.

## 2023-12-13 NOTE — TELEPHONE ENCOUNTER
Noted. Pls reply on rx refill thanks       Requested Prescriptions     Pending Prescriptions Disp Refills    miconazole 2 % External Powder 3 each 3     Sig: Apply 1 each topically as needed for Itching.        Last Office Visit with PCP: 10/16/2023

## 2023-12-13 NOTE — ED INITIAL ASSESSMENT (HPI)
Patient via ems to ED with complaint of constipation and gi bleed post wipe. Daily eliquis use. Patient is AXOX4.

## 2023-12-15 ENCOUNTER — PATIENT OUTREACH (OUTPATIENT)
Dept: CASE MANAGEMENT | Age: 88
End: 2023-12-15

## 2024-01-23 ENCOUNTER — TELEPHONE (OUTPATIENT)
Dept: INTERNAL MEDICINE CLINIC | Facility: CLINIC | Age: 89
End: 2024-01-23

## 2024-02-13 ENCOUNTER — TELEPHONE (OUTPATIENT)
Dept: INTERNAL MEDICINE CLINIC | Facility: CLINIC | Age: 89
End: 2024-02-13

## 2024-03-06 ENCOUNTER — OFFICE VISIT (OUTPATIENT)
Dept: INTERNAL MEDICINE CLINIC | Facility: CLINIC | Age: 89
End: 2024-03-06

## 2024-03-06 VITALS
HEIGHT: 62 IN | WEIGHT: 161 LBS | HEART RATE: 71 BPM | SYSTOLIC BLOOD PRESSURE: 169 MMHG | BODY MASS INDEX: 29.63 KG/M2 | TEMPERATURE: 97 F | OXYGEN SATURATION: 98 % | DIASTOLIC BLOOD PRESSURE: 70 MMHG

## 2024-03-06 DIAGNOSIS — Z00.00 ENCOUNTER FOR ANNUAL HEALTH EXAMINATION: Primary | ICD-10-CM

## 2024-03-06 DIAGNOSIS — I48.0 PAROXYSMAL ATRIAL FIBRILLATION (HCC): ICD-10-CM

## 2024-03-06 DIAGNOSIS — E11.00 TYPE 2 DIABETES MELLITUS WITH HYPEROSMOLARITY WITHOUT COMA, WITHOUT LONG-TERM CURRENT USE OF INSULIN (HCC): ICD-10-CM

## 2024-03-06 DIAGNOSIS — D69.6 THROMBOCYTOPENIA (HCC): ICD-10-CM

## 2024-03-06 LAB
ALBUMIN SERPL-MCNC: 4.5 G/DL (ref 3.2–4.8)
ALBUMIN/GLOB SERPL: 1.6 {RATIO} (ref 1–2)
ALP LIVER SERPL-CCNC: 85 U/L
ALT SERPL-CCNC: 27 U/L
ANION GAP SERPL CALC-SCNC: 4 MMOL/L (ref 0–18)
AST SERPL-CCNC: 25 U/L (ref ?–34)
BASOPHILS # BLD AUTO: 0.06 X10(3) UL (ref 0–0.2)
BASOPHILS NFR BLD AUTO: 0.7 %
BILIRUB SERPL-MCNC: 0.5 MG/DL (ref 0.2–0.9)
BUN BLD-MCNC: 15 MG/DL (ref 9–23)
BUN/CREAT SERPL: 16.7 (ref 10–20)
CALCIUM BLD-MCNC: 9.9 MG/DL (ref 8.7–10.4)
CHLORIDE SERPL-SCNC: 112 MMOL/L (ref 98–112)
CHOLEST SERPL-MCNC: 159 MG/DL (ref ?–200)
CO2 SERPL-SCNC: 29 MMOL/L (ref 21–32)
CREAT BLD-MCNC: 0.9 MG/DL
DEPRECATED RDW RBC AUTO: 44.9 FL (ref 35.1–46.3)
EGFRCR SERPLBLD CKD-EPI 2021: 60 ML/MIN/1.73M2 (ref 60–?)
EOSINOPHIL # BLD AUTO: 0.16 X10(3) UL (ref 0–0.7)
EOSINOPHIL NFR BLD AUTO: 1.9 %
ERYTHROCYTE [DISTWIDTH] IN BLOOD BY AUTOMATED COUNT: 13.8 % (ref 11–15)
EST. AVERAGE GLUCOSE BLD GHB EST-MCNC: 120 MG/DL (ref 68–126)
GLOBULIN PLAS-MCNC: 2.9 G/DL (ref 2.8–4.4)
GLUCOSE BLD-MCNC: 102 MG/DL (ref 70–99)
HBA1C MFR BLD: 5.8 % (ref ?–5.7)
HCT VFR BLD AUTO: 45.3 %
HDLC SERPL-MCNC: 42 MG/DL (ref 40–59)
HGB BLD-MCNC: 14.9 G/DL
IMM GRANULOCYTES # BLD AUTO: 0.02 X10(3) UL (ref 0–1)
IMM GRANULOCYTES NFR BLD: 0.2 %
LDLC SERPL CALC-MCNC: 82 MG/DL (ref ?–100)
LYMPHOCYTES # BLD AUTO: 2.47 X10(3) UL (ref 1–4)
LYMPHOCYTES NFR BLD AUTO: 29.2 %
MCH RBC QN AUTO: 29.6 PG (ref 26–34)
MCHC RBC AUTO-ENTMCNC: 32.9 G/DL (ref 31–37)
MCV RBC AUTO: 89.9 FL
MONOCYTES # BLD AUTO: 0.57 X10(3) UL (ref 0.1–1)
MONOCYTES NFR BLD AUTO: 6.7 %
NEUTROPHILS # BLD AUTO: 5.19 X10 (3) UL (ref 1.5–7.7)
NEUTROPHILS # BLD AUTO: 5.19 X10(3) UL (ref 1.5–7.7)
NEUTROPHILS NFR BLD AUTO: 61.3 %
NONHDLC SERPL-MCNC: 117 MG/DL (ref ?–130)
OSMOLALITY SERPL CALC.SUM OF ELEC: 301 MOSM/KG (ref 275–295)
PLATELET # BLD AUTO: 216 10(3)UL (ref 150–450)
POTASSIUM SERPL-SCNC: 4.2 MMOL/L (ref 3.5–5.1)
PROT SERPL-MCNC: 7.4 G/DL (ref 5.7–8.2)
RBC # BLD AUTO: 5.04 X10(6)UL
SODIUM SERPL-SCNC: 145 MMOL/L (ref 136–145)
TRIGL SERPL-MCNC: 207 MG/DL (ref 30–149)
TSI SER-ACNC: 2.05 MIU/ML (ref 0.55–4.78)
VLDLC SERPL CALC-MCNC: 33 MG/DL (ref 0–30)
WBC # BLD AUTO: 8.5 X10(3) UL (ref 4–11)

## 2024-03-06 PROCEDURE — 3008F BODY MASS INDEX DOCD: CPT | Performed by: INTERNAL MEDICINE

## 2024-03-06 PROCEDURE — 3078F DIAST BP <80 MM HG: CPT | Performed by: INTERNAL MEDICINE

## 2024-03-06 PROCEDURE — 1159F MED LIST DOCD IN RCRD: CPT | Performed by: INTERNAL MEDICINE

## 2024-03-06 PROCEDURE — 36415 COLL VENOUS BLD VENIPUNCTURE: CPT | Performed by: INTERNAL MEDICINE

## 2024-03-06 PROCEDURE — 1125F AMNT PAIN NOTED PAIN PRSNT: CPT | Performed by: INTERNAL MEDICINE

## 2024-03-06 PROCEDURE — 3077F SYST BP >= 140 MM HG: CPT | Performed by: INTERNAL MEDICINE

## 2024-03-06 PROCEDURE — 1170F FXNL STATUS ASSESSED: CPT | Performed by: INTERNAL MEDICINE

## 2024-03-06 PROCEDURE — 1160F RVW MEDS BY RX/DR IN RCRD: CPT | Performed by: INTERNAL MEDICINE

## 2024-03-06 RX ORDER — ACETAMINOPHEN 500 MG
500 TABLET ORAL EVERY 6 HOURS PRN
COMMUNITY

## 2024-03-06 NOTE — PATIENT INSTRUCTIONS
Emily Fernandez's SCREENING SCHEDULE   Tests on this list are recommended by your physician but may not be covered, or covered at this frequency, by your insurer.   Please check with your insurance carrier before scheduling to verify coverage.   PREVENTATIVE SERVICES FREQUENCY &  COVERAGE DETAILS LAST COMPLETION DATE   Diabetes Screening    Fasting Blood Sugar /  Glucose    One screening every 12 months if never tested or if previously tested but not diagnosed with pre-diabetes   One screening every 6 months if diagnosed with pre-diabetes Lab Results   Component Value Date     (H) 03/06/2024        Cardiovascular Disease Screening    Lipid Panel  Cholesterol  Lipoprotein (HDL)  Triglycerides Covered every 5 years for all Medicare beneficiaries without apparent signs or symptoms of cardiovascular disease Lab Results   Component Value Date    CHOLEST 159 03/06/2024    HDL 42 03/06/2024    LDL 82 03/06/2024    TRIG 207 (H) 03/06/2024         Electrocardiogram (EKG)   Covered if needed at Welcome to Medicare, and non-screening if indicated for medical reasons 09/12/2023      Ultrasound Screening for Abdominal Aortic Aneurysm (AAA) Covered once in a lifetime for one of the following risk factors   • Men who are 65-75 years old and have ever smoked   • Anyone with a family history -     Colorectal Cancer Screening  Covered for ages 50-85; only need ONE of the following:    Colonoscopy   Covered every 10 years    Covered every 2 years if patient is at high risk or previous colonoscopy was abnormal -    No recommendations at this time    Flexible Sigmoidoscopy   Covered every 4 years -    Fecal Occult Blood Test Covered annually -   Bone Density Screening    Bone density screening    Covered every 2 years after age 65 if diagnosed with risk of osteoporosis or estrogen deficiency.    Covered yearly for long-term glucocorticoid medication use (Steroids) No results found for this or any previous visit.      No  recommendations at this time   Pap and Pelvic    Pap   Covered every 2 years for women at normal risk; Annually if at high risk -  No recommendations at this time    Chlamydia Annually if high risk -  No recommendations at this time   Screening Mammogram    Mammogram     Recommend annually for all female patients aged 40 and older    One baseline mammogram covered for patients aged 35-39 -    No recommendations at this time    Immunizations    Influenza Covered once per flu season  Please get every year -  No recommendations at this time    Pneumococcal Each vaccine ([id number] & [id number]) covered once after 65 Prevnar 13: -    [id number]: -     No recommendations at this time    Hepatitis B One screening covered for patients with certain risk factors   -  No recommendations at this time    Tetanus Toxoid Not covered by Medicare Part B unless medically necessary (cut with metal); may be covered with your pharmacy prescription benefits -    Tetanus, Diptheria and Pertusis TD and TDaP Not covered by Medicare Part B -  No recommendations at this time    Zoster Not covered by Medicare Part B; may be covered with your pharmacy  prescription benefits -  No recommendations at this time     Diabetes      Hemoglobin A1C Annually; if last result is elevated, may be asked to retest more frequently.    Medicare covers every 3 months Lab Results   Component Value Date     03/06/2024    A1C 5.8 (H) 03/06/2024       No recommendations at this time    Creat/alb ratio Annually No results found for: \"MICROALBCREA\", \"MALBCRECALC\"    LDL Annually Lab Results   Component Value Date    LDL 82 03/06/2024       Dilated Eye Exam Annually Last Diabetic Eye Exam:  No data recorded  No data recorded       Emily Fernandez's SCREENING SCHEDULE   Tests on this list are recommended by your physician but may not be covered, or covered at this frequency, by your insurer.   Please check with your insurance carrier before scheduling to verify  coverage.   PREVENTATIVE SERVICES FREQUENCY &  COVERAGE DETAILS LAST COMPLETION DATE   Diabetes Screening    Fasting Blood Sugar /  Glucose    One screening every 12 months if never tested or if previously tested but not diagnosed with pre-diabetes   One screening every 6 months if diagnosed with pre-diabetes Lab Results   Component Value Date     (H) 03/06/2024        Cardiovascular Disease Screening    Lipid Panel  Cholesterol  Lipoprotein (HDL)  Triglycerides Covered every 5 years for all Medicare beneficiaries without apparent signs or symptoms of cardiovascular disease Lab Results   Component Value Date    CHOLEST 159 03/06/2024    HDL 42 03/06/2024    LDL 82 03/06/2024    TRIG 207 (H) 03/06/2024         Electrocardiogram (EKG)   Covered if needed at Welcome to Medicare, and non-screening if indicated for medical reasons 09/12/2023      Ultrasound Screening for Abdominal Aortic Aneurysm (AAA) Covered once in a lifetime for one of the following risk factors   • Men who are 65-75 years old and have ever smoked   • Anyone with a family history -     Colorectal Cancer Screening  Covered for ages 50-85; only need ONE of the following:    Colonoscopy   Covered every 10 years    Covered every 2 years if patient is at high risk or previous colonoscopy was abnormal -    No recommendations at this time    Flexible Sigmoidoscopy   Covered every 4 years -    Fecal Occult Blood Test Covered annually -   Bone Density Screening    Bone density screening    Covered every 2 years after age 65 if diagnosed with risk of osteoporosis or estrogen deficiency.    Covered yearly for long-term glucocorticoid medication use (Steroids) No results found for this or any previous visit.      No recommendations at this time   Pap and Pelvic    Pap   Covered every 2 years for women at normal risk; Annually if at high risk -  No recommendations at this time    Chlamydia Annually if high risk -  No recommendations at this time    Screening Mammogram    Mammogram     Recommend annually for all female patients aged 40 and older    One baseline mammogram covered for patients aged 35-39 -    No recommendations at this time    Immunizations    Influenza Covered once per flu season  Please get every year -  No recommendations at this time    Pneumococcal Each vaccine ([id number] & [id number]) covered once after 65 Prevnar 13: -    [id number]: -     No recommendations at this time    Hepatitis B One screening covered for patients with certain risk factors   -  No recommendations at this time    Tetanus Toxoid Not covered by Medicare Part B unless medically necessary (cut with metal); may be covered with your pharmacy prescription benefits -    Tetanus, Diptheria and Pertusis TD and TDaP Not covered by Medicare Part B -  No recommendations at this time    Zoster Not covered by Medicare Part B; may be covered with your pharmacy  prescription benefits -  No recommendations at this time     Diabetes      Hemoglobin A1C Annually; if last result is elevated, may be asked to retest more frequently.    Medicare covers every 3 months Lab Results   Component Value Date     03/06/2024    A1C 5.8 (H) 03/06/2024       No recommendations at this time    Creat/alb ratio Annually No results found for: \"MICROALBCREA\", \"MALBCRECALC\"    LDL Annually Lab Results   Component Value Date    LDL 82 03/06/2024       Dilated Eye Exam Annually Last Diabetic Eye Exam:  No data recorded  No data recorded

## 2024-03-06 NOTE — PROGRESS NOTES
Subjective:   Emily Fernandez is a 89 year old female who presents for a Medicare Subsequent Annual Wellness visit (Pt already had Initial Annual Wellness) and scheduled follow up of multiple significant but stable problems.       History/Other:   Fall Risk Assessment:   She has been screened for Falls and is High Risk. Fall Prevention information provided to patient in After Visit Summary.    Do you feel unsteady when standing or walking?: Yes (uses walker)  Do you worry about falling?: No  Have you fallen in the past year?: Yes  How many times have you fallen?: 1  Were you injured?: No     Cognitive Assessment:   She had a completely normal cognitive assessment - see flowsheet entries       Functional Ability/Status:   Emily Fernandez has some abnormal functions as listed below:  She has Dressing and/or Bathing issues based on screening of functional status.  Difficulty dressing or bathing?: Yes  Bathing or Showering: Need some help  Dressing: Need some help  She has Toileting difficulties based on screening of functional status.She has Driving difficulties based on screening of functional status. She has Meal Preparation difficulties based on screening of functional status.She has difficulties Managing Money/Bills based on screening of functional status.She has difficulties Shopping for Groceries based on screening of functional status. She has difficulties Taking Meds as Rx'd based on screening of functional status. She has Hearing problems based on screening of functional status.She has Vision problems based on screening of functional status. She has Walking problems based on screening of functional status. She has problems with Daily Activities based on screening of functional status. She has problems with Memory based on screening of functional status.       Depression Screening (PHQ-2/PHQ-9): PHQ-2 SCORE: 0  , done 3/6/2024   [facility] Suicide Screening on 3/6/2024 was No Risk.     5 minutes spent screening  and counseling for depression    Advanced Directives:   She does NOT have a Living Will. [ ]  She does NOT have a Power of  for Health Care. [ ]  Discussed Advance Care Planning with patient (and family/surrogate if present). Standard forms made available to patient in After Visit Summary.      Patient Active Problem List   Diagnosis    Hypokalemia    Hyperglycemia    Syncope and collapse    Injury of head, initial encounter    Vertigo    Aortic valve stenosis    Atrial fibrillation with rapid ventricular response (HCC)    Histoplasma capsulatum with retinitis    Legal blindness    Stage 2 chronic kidney disease    Type 2 diabetes mellitus (HCC)    Cerebrovascular accident (HCC)    Essential hypertension, benign    Stercoral colitis    Impacted stool in rectum (HCC)    Rectal bleeding    Thrombocytopenia ([facility])     Allergies:  She is allergic to latex and sulfa antibiotics.    Current Medications:  Outpatient Medications Marked as Taking for the 3/6/24 encounter (Office Visit) with Ivy Horner MD   Medication Sig    acetaminophen 500 MG Oral Tab Take 1 tablet (500 mg total) by mouth every 6 (six) hours as needed for Pain.    miconazole 2 % External Powder Apply 1 each topically as needed for Itching.    docusate sodium 100 MG Oral Cap Take 1 capsule (100 mg total) by mouth 2 (two) times daily.    amLODIPine 2.5 MG Oral Tab Take 1 tablet (2.5 mg total) by mouth daily as needed (if SBP>150).    metoprolol succinate ER 50 MG Oral Tablet 24 Hr Take 1 tablet (50 mg total) by mouth Daily Beta Blocker.    meclizine 12.5 MG Oral Tab Take 1 tablet (12.5 mg total) by mouth 3 (three) times daily as needed for Dizziness.    apixaban 5 MG Oral Tab Take 1 tablet (5 mg total) by mouth 2 (two) times daily.    atorvastatin 40 MG Oral Tab Take 1 tablet (40 mg total) by mouth nightly.       Medical History:  She  has a past medical history of Arrhythmia, Diabetes (HCC), Essential hypertension, Hearing impairment, and Visual  impairment.  Surgical History:  She  has a past surgical history that includes hip replacement surgery and removal gallbladder.   Family History:  Her family history includes Cancer in her daughter; No Known Problems in her father and mother.  Social History:  She  reports that she has never smoked. She has never used smokeless tobacco. She reports that she does not drink alcohol and does not use drugs.    Tobacco:  She has never smoked tobacco.    CAGE Alcohol Screen:   Cage screening not needed because reported NO to Alcohol use on social history.      Patient Care Team:  Ivy Horner MD as PCP - General (Internal Medicine)    Review of Systems  GENERAL: feels well otherwise  SKIN: denies any unusual skin lesions  EYES: denies blurred vision or double vision  HEENT: denies nasal congestion, sinus pain or ST  LUNGS: denies shortness of breath with exertion  CARDIOVASCULAR: denies chest pain on exertion  GI: denies abdominal pain, denies heartburn  : denies dysuria, vaginal discharge or itching, no complaint of urinary incontinence   MUSCULOSKELETAL: denies back pain  NEURO: denies headaches  PSYCHE: denies depression or anxiety  HEMATOLOGIC: denies hx of anemia  ENDOCRINE: denies thyroid history  ALL/ASTHMA: denies hx of allergy or asthma    Objective:   Physical Exam  General Appearance:  Alert, cooperative, no distress, appears stated age   Head:  Normocephalic, without obvious abnormality, atraumatic   Eyes:  PERRL, conjunctiva/corneas clear, EOM's intact both eyes   Ears:  Normal TM's and external ear canals, both ears   Nose: Nares normal, septum midline,mucosa normal, no drainage or sinus tenderness   Throat: Lips, mucosa, and tongue normal; teeth and gums normal   Neck: Supple, symmetrical, trachea midline, no adenopathy;  thyroid: not enlarged, symmetric, no tenderness/mass/nodules; no carotid bruit or JVD   Back:   Symmetric, no curvature, ROM normal, no CVA tenderness   Lungs:   Clear to auscultation  bilaterally, respirations unlabored   Heart:  Regular rate and rhythm, S1 and S2 normal, no murmur, rub, or gallop   Abdomen:   Soft, non-tender, bowel sounds active all four quadrants,  no masses, no organomegaly   Pelvic: Deferred   Extremities: Extremities normal, atraumatic, no cyanosis or edema   Pulses: 2+ and symmetric   Skin: Skin color, texture, turgor normal, no rashes or lesions   Lymph nodes: Cervical, supraclavicular, and axillary nodes normal   Neurologic: Normal     Bilateral barefoot skin diabetic exam is normal, visualized feet and the appearance is normal.  Bilateral monofilament/sensation of both feet is normal.  Pulsation pedal pulse exam of both lower legs/feet is normal as well.     Ht 5' 2\" (1.575 m)   Wt 161 lb (73 kg)   BMI 29.45 kg/m²  Estimated body mass index is 29.45 kg/m² as calculated from the following:    Height as of this encounter: 5' 2\" (1.575 m).    Weight as of this encounter: 161 lb (73 kg).    Medicare Hearing Assessment:   Hearing Screening    Screening Method: Questionnaire  I have a problem hearing over the telephone: Yes I have trouble following the conversations when two or more people are talking at the same time: Yes   I have trouble understanding things on the TV: Yes I have to strain to understand conversations: Yes   I have to worry about missing the telephone ring or doorbell: Sometimes I have trouble hearing conversations in a noisy background such as a crowded room or restaurant: Yes   I get confused about where sounds come from: Sometimes I misunderstand some words in a sentence and need to ask people to repeat themselves: Yes   I especially have trouble understanding the speech of women and children: Sometimes I have trouble understanding the speaker in a large room such as at a meeting or place of Buddhist: Sometimes   Many people I talk to seem to mumble (or don't speak clearly): Sometimes People get annoyed because I misunderstand what they say: No   I  misunderstand what others are saying and make inappropriate responses: Sometimes I avoid social activities because I cannot hear well and fear I will reply improperly: No   Family members and friends have told me they think I may have hearing loss: Yes                   Assessment & Plan:   Emily Fernandez is a 89 year old female who presents for a Medicare Assessment.     There are no diagnoses linked to this encounter.  The patient indicates understanding of these issues and agrees to the plan.  Reinforced healthy diet, lifestyle, and exercise.      No follow-ups on file.     TERRY WRIGHT MD, 3/6/2024     Supplementary Documentation:   General Health:  At any time do you feel concerned for the safety/well-being of yourself and/or your children, in your home or elsewhere?: No       Emily Fernandez's SCREENING SCHEDULE   Tests on this list are recommended by your physician but may not be covered, or covered at this frequency, by your insurer.   Please check with your insurance carrier before scheduling to verify coverage.   PREVENTATIVE SERVICES FREQUENCY &  COVERAGE DETAILS LAST COMPLETION DATE   Diabetes Screening    Fasting Blood Sugar /  Glucose    One screening every 12 months if never tested or if previously tested but not diagnosed with pre-diabetes   One screening every 6 months if diagnosed with pre-diabetes Lab Results   Component Value Date     (H) 10/08/2023        Cardiovascular Disease Screening    Lipid Panel  Cholesterol  Lipoprotein (HDL)  Triglycerides Covered every 5 years for all Medicare beneficiaries without apparent signs or symptoms of cardiovascular disease No results found for: \"CHOLEST\", \"HDL\", \"LDL\", \"LDLD\", \"LDLC\", \"TRIG\"      Electrocardiogram (EKG)   Covered if needed at Welcome to Medicare, and non-screening if indicated for medical reasons 09/12/2023      Ultrasound Screening for Abdominal Aortic Aneurysm (AAA) Covered once in a lifetime for one of the following risk factors     Men who are 65-75 years old and have ever smoked    Anyone with a family history -     Colorectal Cancer Screening  Covered for ages 50-85; only need ONE of the following:    Colonoscopy   Covered every 10 years    Covered every 2 years if patient is at high risk or previous colonoscopy was abnormal -    No recommendations at this time    Flexible Sigmoidoscopy   Covered every 4 years -    Fecal Occult Blood Test Covered annually -   Bone Density Screening    Bone density screening    Covered every 2 years after age 65 if diagnosed with risk of osteoporosis or estrogen deficiency.    Covered yearly for long-term glucocorticoid medication use (Steroids) No results found for this or any previous visit.      No recommendations at this time   Pap and Pelvic    Pap   Covered every 2 years for women at normal risk; Annually if at high risk -  No recommendations at this time    Chlamydia Annually if high risk -  No recommendations at this time   Screening Mammogram    Mammogram     Recommend annually for all female patients aged 40 and older    One baseline mammogram covered for patients aged 35-39 -    No recommendations at this time    Immunizations    Influenza Covered once per flu season  Please get every year -  Influenza Vaccine(1) Never done    Pneumococcal Each vaccine ([id number] & [id number]) covered once after 65 Prevnar 13: -    [id number]: -     Pneumococcal Vaccination(1 of 2 - PCV) Never done    Hepatitis B One screening covered for patients with certain risk factors   -  No recommendations at this time    Tetanus Toxoid Not covered by Medicare Part B unless medically necessary (cut with metal); may be covered with your pharmacy prescription benefits -    Tetanus, Diptheria and Pertusis TD and TDaP Not covered by Medicare Part B -  No recommendations at this time    Zoster Not covered by Medicare Part B; may be covered with your pharmacy  prescription benefits -  Zoster Vaccines(1 of 2) Never done      Diabetes      Hemoglobin A1C Annually; if last result is elevated, may be asked to retest more frequently.    Medicare covers every 3 months Lab Results   Component Value Date     10/07/2023    A1C 5.7 (H) 10/07/2023       No recommendations at this time    Creat/alb ratio Annually No results found for: \"MICROALBCREA\", \"MALBCRECALC\"    LDL Annually No results found for: \"LDL\", \"LDLC\"    Dilated Eye Exam Annually Last Diabetic Eye Exam:  No data recorded  No data recorded

## 2024-03-07 PROBLEM — R73.9 HYPERGLYCEMIA: Status: RESOLVED | Noted: 2023-02-21 | Resolved: 2024-03-07

## 2024-03-07 PROBLEM — E87.6 HYPOKALEMIA: Status: RESOLVED | Noted: 2023-02-21 | Resolved: 2024-03-07

## 2024-03-07 NOTE — PROGRESS NOTES
Subjective:   Emily Fernandez is a 89 year old female who presents for a Medicare Subsequent Annual Wellness visit (Pt already had Initial Annual Wellness) and scheduled follow up of multiple significant but stable problems.       History/Other:   Fall Risk Assessment:   She has been screened for Falls and is High Risk. Fall Prevention information provided to patient in After Visit Summary.    Do you feel unsteady when standing or walking?: Yes (uses walker)  Do you worry about falling?: No  Have you fallen in the past year?: Yes  How many times have you fallen?: 1  Were you injured?: No     Cognitive Assessment:   She had a completely normal cognitive assessment - see flowsheet entries     Functional Ability/Status:   Emily Fernandez has some abnormal functions as listed below:  She has Dressing and/or Bathing issues based on screening of functional status.  Difficulty dressing or bathing?: Yes  Bathing or Showering: Need some help  Dressing: Need some help  She has Toileting difficulties based on screening of functional status.She has Driving difficulties based on screening of functional status. She has Meal Preparation difficulties based on screening of functional status.She has difficulties Managing Money/Bills based on screening of functional status.She has difficulties Shopping for Groceries based on screening of functional status. She has difficulties Taking Meds as Rx'd based on screening of functional status. She has Hearing problems based on screening of functional status.She has Vision problems based on screening of functional status. She has Walking problems based on screening of functional status. She has problems with Daily Activities based on screening of functional status. She has problems with Memory based on screening of functional status.       Depression Screening (PHQ-2/PHQ-9): PHQ-2 SCORE: 0  , done 3/6/2024   [facility] Suicide Screening on 3/6/2024 was No Risk.     5 minutes spent screening  and counseling for depression    Advanced Directives:   She does NOT have a Living Will. [Do you have a living will?: No]  She does NOT have a Power of  for Health Care. [Do you have a healthcare power of ?: No]  Discussed Advance Care Planning with patient (and family/surrogate if present). Standard forms made available to patient in After Visit Summary.      Patient Active Problem List   Diagnosis    Hypokalemia    Hyperglycemia    Syncope and collapse    Injury of head, initial encounter    Vertigo    Aortic valve stenosis    Atrial fibrillation with rapid ventricular response (HCC)    Histoplasma capsulatum with retinitis    Legal blindness    Stage 2 chronic kidney disease    Type 2 diabetes mellitus (HCC)    Cerebrovascular accident (HCC)    Essential hypertension, benign    Stercoral colitis    Impacted stool in rectum (HCC)    Rectal bleeding    Thrombocytopenia ([facility])     Allergies:  She is allergic to latex and sulfa antibiotics.    Current Medications:  Outpatient Medications Marked as Taking for the 3/6/24 encounter (Office Visit) with Ivy Horner MD   Medication Sig    miconazole 2 % External Powder Apply 1 each topically as needed for Itching.    acetaminophen 500 MG Oral Tab Take 1 tablet (500 mg total) by mouth every 6 (six) hours as needed for Pain.    docusate sodium 100 MG Oral Cap Take 1 capsule (100 mg total) by mouth 2 (two) times daily.    amLODIPine 2.5 MG Oral Tab Take 1 tablet (2.5 mg total) by mouth daily as needed (if SBP>150).    metoprolol succinate ER 50 MG Oral Tablet 24 Hr Take 1 tablet (50 mg total) by mouth Daily Beta Blocker.    meclizine 12.5 MG Oral Tab Take 1 tablet (12.5 mg total) by mouth 3 (three) times daily as needed for Dizziness.    apixaban 5 MG Oral Tab Take 1 tablet (5 mg total) by mouth 2 (two) times daily.    atorvastatin 40 MG Oral Tab Take 1 tablet (40 mg total) by mouth nightly.       Medical History:  She  has a past medical history of  Arrhythmia, Diabetes (HCC), Essential hypertension, Hearing impairment, and Visual impairment.  Surgical History:  She  has a past surgical history that includes hip replacement surgery and removal gallbladder.   Family History:  Her family history includes Cancer in her daughter; No Known Problems in her father and mother.  Social History:  She  reports that she has never smoked. She has never used smokeless tobacco. She reports that she does not drink alcohol and does not use drugs.    Tobacco:  She has never smoked tobacco.    CAGE Alcohol Screen:   CAGE screening score of 0 on 3/6/2024, showing low risk of alcohol abuse.      Patient Care Team:  Ivy Horner MD as PCP - General (Internal Medicine)    Review of Systems  GENERAL: feels well otherwise  SKIN: denies any unusual skin lesions  EYES: denies blurred vision or double vision  HEENT: denies nasal congestion, sinus pain or ST  LUNGS: denies shortness of breath with exertion  CARDIOVASCULAR: denies chest pain on exertion  GI: denies abdominal pain, denies heartburn  : denies dysuria, vaginal discharge or itching, no complaint of urinary incontinence   MUSCULOSKELETAL: denies back pain  NEURO: denies headaches  PSYCHE: denies depression or anxiety  HEMATOLOGIC: denies hx of anemia  ENDOCRINE: denies thyroid history  ALL/ASTHMA: denies hx of allergy or asthma    Objective:   Physical Exam  General Appearance:  Alert, cooperative, no distress, appears stated age   Head:  Normocephalic, without obvious abnormality, atraumatic   Eyes:  PERRL, conjunctiva/corneas clear, EOM's intact both eyes   Ears:  Normal TM's and external ear canals, both ears   Nose: Nares normal, septum midline,mucosa normal, no drainage or sinus tenderness   Throat: Lips, mucosa, and tongue normal; teeth and gums normal   Neck: Supple, symmetrical, trachea midline, no adenopathy;  thyroid: not enlarged, symmetric, no tenderness/mass/nodules; no carotid bruit or JVD   Back:   Symmetric,  no curvature, ROM normal, no CVA tenderness   Lungs:   Clear to auscultation bilaterally, respirations unlabored   Heart:  Regular rate and rhythm, S1 and S2 normal, no murmur, rub, or gallop   Abdomen:   Soft, non-tender, bowel sounds active all four quadrants,  no masses, no organomegaly   Pelvic: Deferred   Extremities: Extremities normal, atraumatic, no cyanosis or edema   Pulses: 2+ and symmetric   Skin: Skin color, texture, turgor normal, no rashes or lesions   Lymph nodes: Cervical, supraclavicular, and axillary nodes normal   Neurologic: Normal       BP (!) 169/70 (BP Location: Left arm, Patient Position: Sitting)   Pulse 71   Temp 97.3 °F (36.3 °C) (Temporal)   Ht 5' 2\" (1.575 m)   Wt 161 lb (73 kg)   SpO2 98%   BMI 29.45 kg/m²  Estimated body mass index is 29.45 kg/m² as calculated from the following:    Height as of this encounter: 5' 2\" (1.575 m).    Weight as of this encounter: 161 lb (73 kg).    Medicare Hearing Assessment:   Hearing Screening    Screening Method: Questionnaire  I have a problem hearing over the telephone: Yes I have trouble following the conversations when two or more people are talking at the same time: Yes   I have trouble understanding things on the TV: Yes I have to strain to understand conversations: Yes   I have to worry about missing the telephone ring or doorbell: Sometimes I have trouble hearing conversations in a noisy background such as a crowded room or restaurant: Yes   I get confused about where sounds come from: Sometimes I misunderstand some words in a sentence and need to ask people to repeat themselves: Yes   I especially have trouble understanding the speech of women and children: Sometimes I have trouble understanding the speaker in a large room such as at a meeting or place of [employer]: Sometimes   Many people I talk to seem to mumble (or don't speak clearly): Sometimes People get annoyed because I misunderstand what they say: No   I misunderstand what others  are saying and make inappropriate responses: Sometimes I avoid social activities because I cannot hear well and fear I will reply improperly: No   Family members and friends have told me they think I may have hearing loss: Yes                   Assessment & Plan:   Emily Fernandez is a 89 year old female who presents for a Medicare Assessment.     1. Encounter for annual health examination (Primary)  -     CBC With Differential With Platelet; Future; Expected date: 03/06/2024  -     Comp Metabolic Panel (14); Future; Expected date: 03/06/2024  -     TSH W Reflex To Free T4; Future; Expected date: 03/06/2024  -     Lipid Panel; Future; Expected date: 03/06/2024  -     Hemoglobin A1C; Future; Expected date: 03/06/2024  -     CBC With Differential With Platelet  -     Comp Metabolic Panel (14)  -     TSH W Reflex To Free T4  -     Lipid Panel  -     Hemoglobin A1C  2. Type 2 diabetes mellitus with hyperosmolarity without coma, without long-term current use of insulin (HCC) check hemoglobin A1c, referral for ophthalmology,  -     Microalb/Creat Ratio, Random Urine; Future; Expected date: 03/06/2024  -     Ophthalmology Referral - In Network  3. Paroxysmal atrial fibrillation (HCC) stable, continue with the medication she is on Eliquis risk for bleeding discussed  4. Thrombocytopenia (HCC) recheck CBC  Other orders  -     Miconazole Nitrate; Apply 1 each topically as needed for Itching.  Dispense: 3 each; Refill: 3  5.  Essential hypertension, benign monitor blood pressure continue with current medication  6.  History of CVA-stable  7.  Histoplasma capsulotomy with retinitis, follow-up with ophthalmology  8.  Aortic valve stenosis she is following with cardiology  9.  Legally blind supportive care  The patient indicates understanding of these issues and agrees to the plan.  Reinforced healthy diet, lifestyle, and exercise.      No follow-ups on file.     TERRY WRIGHT MD, 3/7/2024     Supplementary Documentation:    General Health:  In the past six months, have you lost more than 10 pounds without trying?: 2 - No  Has your appetite been poor?: No  Type of Diet: Low Carb  How does the patient maintain a good energy level?: Other  How would you describe your daily physical activity?: Light  How would you describe your current health state?: Good  How do you maintain positive mental well-being?: Visiting Friends  On a scale of 0 to 10, with 0 being no pain and 10 being severe pain, what is your pain level?: 4 - (Moderate)  In the past six months, have you experienced urine leakage?: 1-Yes  At any time do you feel concerned for the safety/well-being of yourself and/or your children, in your home or elsewhere?: No  Have you had any immunizations at another office such as Influenza, Hepatitis B, Tetanus, or Pneumococcal?: Zulema Fernandez's SCREENING SCHEDULE   Tests on this list are recommended by your physician but may not be covered, or covered at this frequency, by your insurer.   Please check with your insurance carrier before scheduling to verify coverage.   PREVENTATIVE SERVICES FREQUENCY &  COVERAGE DETAILS LAST COMPLETION DATE   Diabetes Screening    Fasting Blood Sugar /  Glucose    One screening every 12 months if never tested or if previously tested but not diagnosed with pre-diabetes   One screening every 6 months if diagnosed with pre-diabetes Lab Results   Component Value Date     (H) 03/06/2024        Cardiovascular Disease Screening    Lipid Panel  Cholesterol  Lipoprotein (HDL)  Triglycerides Covered every 5 years for all Medicare beneficiaries without apparent signs or symptoms of cardiovascular disease Lab Results   Component Value Date    CHOLEST 159 03/06/2024    HDL 42 03/06/2024    LDL 82 03/06/2024    TRIG 207 (H) 03/06/2024         Electrocardiogram (EKG)   Covered if needed at Welcome to Medicare, and non-screening if indicated for medical reasons 09/12/2023      Ultrasound Screening for  Abdominal Aortic Aneurysm (AAA) Covered once in a lifetime for one of the following risk factors    Men who are 65-75 years old and have ever smoked    Anyone with a family history -     Colorectal Cancer Screening  Covered for ages 50-85; only need ONE of the following:    Colonoscopy   Covered every 10 years    Covered every 2 years if patient is at high risk or previous colonoscopy was abnormal -    No recommendations at this time    Flexible Sigmoidoscopy   Covered every 4 years -    Fecal Occult Blood Test Covered annually -   Bone Density Screening    Bone density screening    Covered every 2 years after age 65 if diagnosed with risk of osteoporosis or estrogen deficiency.    Covered yearly for long-term glucocorticoid medication use (Steroids) No results found for this or any previous visit.      No recommendations at this time   Pap and Pelvic    Pap   Covered every 2 years for women at normal risk; Annually if at high risk -  No recommendations at this time    Chlamydia Annually if high risk -  No recommendations at this time   Screening Mammogram    Mammogram     Recommend annually for all female patients aged 40 and older    One baseline mammogram covered for patients aged 35-39 -    No recommendations at this time    Immunizations    Influenza Covered once per flu season  Please get every year -  No recommendations at this time    Pneumococcal Each vaccine ([id number] & [id number]) covered once after 65 Prevnar 13: -    [id number]: -     No recommendations at this time    Hepatitis B One screening covered for patients with certain risk factors   -  No recommendations at this time    Tetanus Toxoid Not covered by Medicare Part B unless medically necessary (cut with metal); may be covered with your pharmacy prescription benefits -    Tetanus, Diptheria and Pertusis TD and TDaP Not covered by Medicare Part B -  No recommendations at this time    Zoster Not covered by Medicare Part B; may be covered with  your pharmacy  prescription benefits -  No recommendations at this time     Diabetes      Hemoglobin A1C Annually; if last result is elevated, may be asked to retest more frequently.    Medicare covers every 3 months Lab Results   Component Value Date     03/06/2024    A1C 5.8 (H) 03/06/2024       No recommendations at this time    Creat/alb ratio Annually No results found for: \"MICROALBCREA\", \"MALBCRECALC\"    LDL Annually Lab Results   Component Value Date    LDL 82 03/06/2024       Dilated Eye Exam Annually Last Diabetic Eye Exam:  No data recorded  No data recorded

## 2024-06-21 ENCOUNTER — OFFICE VISIT (OUTPATIENT)
Dept: OTOLARYNGOLOGY | Facility: CLINIC | Age: 89
End: 2024-06-21

## 2024-06-21 DIAGNOSIS — H61.23 BILATERAL IMPACTED CERUMEN: Primary | ICD-10-CM

## 2024-06-21 PROCEDURE — 1160F RVW MEDS BY RX/DR IN RCRD: CPT | Performed by: STUDENT IN AN ORGANIZED HEALTH CARE EDUCATION/TRAINING PROGRAM

## 2024-06-21 PROCEDURE — 1159F MED LIST DOCD IN RCRD: CPT | Performed by: STUDENT IN AN ORGANIZED HEALTH CARE EDUCATION/TRAINING PROGRAM

## 2024-06-21 PROCEDURE — 69210 REMOVE IMPACTED EAR WAX UNI: CPT | Performed by: STUDENT IN AN ORGANIZED HEALTH CARE EDUCATION/TRAINING PROGRAM

## 2024-06-21 NOTE — PROGRESS NOTES
Emily Fernandez is a 89 year old female.    Chief Complaint   Patient presents with    Ear Problem     Ear cleaning        HISTORY OF PRESENT ILLNESS    Patient presents for cerumen removal. No other complaints or concerns at this time    Social History     Socioeconomic History    Marital status:    Tobacco Use    Smoking status: Never    Smokeless tobacco: Never   Vaping Use    Vaping status: Never Used   Substance and Sexual Activity    Alcohol use: Never    Drug use: Never   Other Topics Concern    Grew up on a farm No    History of tanning Yes    Outdoor occupation No    Breast feeding No    Reaction to local anesthetic No    Pt has a pacemaker No    Pt has a defibrillator No       Family History   Problem Relation Age of Onset    No Known Problems Father     No Known Problems Mother     Cancer Daughter        Past Medical History:    Arrhythmia    Diabetes (HCC)    Essential hypertension    Hearing impairment    Hypokalemia    Visual impairment       Past Surgical History:   Procedure Laterality Date    Hip replacement surgery      Removal gallbladder         REVIEW OF SYSTEMS    System Neg/Pos Details   Constitutional Negative Fatigue, fever and weight loss.   ENMT Negative Drooling.   Eyes Negative Blurred vision and vision changes.   Respiratory Negative Dyspnea and wheezing.   Cardio Negative Chest pain, irregular heartbeat/palpitations and syncope.   GI Negative Abdominal pain and diarrhea.   Endocrine Negative Cold intolerance and heat intolerance.   Neuro Negative Tremors.   Psych Negative Anxiety and depression.   Integumentary Negative Frequent skin infections, pigment change and rash.   Hema/Lymph Negative Easy bleeding and easy bruising.           PHYSICAL EXAM    There were no vitals taken for this visit.       Constitutional Normal Overall appearance - Normal.        Neck Exam Normal Inspection - Normal. Palpation - Normal. Parotid gland - Normal. Thyroid gland - Normal.             Head/Face  Normal Facial features - Normal. Eyebrows - Normal. Skull - Normal.             Ears Normal Inspection - Right: Normal, Left: Normal. Canal - Right: Normal, Left: Normal. TM - Right: Normal, Left: Normal.   Skin Normal Inspection - Normal.                              Canals:  Right: Canal reveals cerumen impaction,   Left: Canal reveals cerumen impaction,     Tympanic Membranes:  Right: Normal tympanic membrane.   Left: Normal tympanic membrane.     TM Visualized Method:   Right TM examined via otomicroscopy.    Left TM examined via otomicroscopy.      PROCEDURE:    Removal of cerumen impaction   The cerumen impaction was completely removed using microscopy.   Removal was completed by using acurette and/or suction.   Comments: Return to clinic as needed.  Avoid q-tips, water precautions and use over the counter wax remedies as needed.      Current Outpatient Medications:     miconazole 2 % External Powder, Apply 1 each topically as needed for Itching., Disp: 3 each, Rfl: 3    acetaminophen 500 MG Oral Tab, Take 1 tablet (500 mg total) by mouth every 6 (six) hours as needed for Pain., Disp: , Rfl:     docusate sodium 100 MG Oral Cap, Take 1 capsule (100 mg total) by mouth 2 (two) times daily., Disp: , Rfl:     amLODIPine 2.5 MG Oral Tab, Take 1 tablet (2.5 mg total) by mouth daily as needed (if SBP>150)., Disp: , Rfl:     metoprolol succinate ER 50 MG Oral Tablet 24 Hr, Take 1 tablet (50 mg total) by mouth Daily Beta Blocker., Disp: 30 tablet, Rfl: 0    meclizine 12.5 MG Oral Tab, Take 1 tablet (12.5 mg total) by mouth 3 (three) times daily as needed for Dizziness., Disp: 20 tablet, Rfl: 0    apixaban 5 MG Oral Tab, Take 1 tablet (5 mg total) by mouth 2 (two) times daily., Disp: , Rfl:     atorvastatin 40 MG Oral Tab, Take 1 tablet (40 mg total) by mouth nightly., Disp: , Rfl:   ASSESSMENT AND PLAN    1. Bilateral impacted cerumen      All cerumen was removed using microscopy. I have asked the patient to return to  see me as needed for repeat cerumen removal in the future.      Cisco Murray MD    6/21/2024    10:26 AM

## 2024-07-03 ENCOUNTER — NURSE TRIAGE (OUTPATIENT)
Dept: INTERNAL MEDICINE CLINIC | Facility: CLINIC | Age: 89
End: 2024-07-03

## 2024-07-03 NOTE — TELEPHONE ENCOUNTER
Patient's daughter called to request a Shingles vaccine for patient.     Patient's daughter is also requesting an Order for Labs to check patient for all her levels. Riddhi advised Mom has been feeling dizzy from time to time and is on blood pressure medication. Patient's call was transferred from Triage.

## 2024-07-03 NOTE — TELEPHONE ENCOUNTER
Action Requested: Summary for Provider     []  Critical Lab, Recommendations Needed  [] Need Additional Advice  []   FYI    []   Need Orders  [] Need Medications Sent to Pharmacy  []  Other     SUMMARY: per protocol, patient should be seen within 2 weeks. Appointment scheduled for Monday.   Future Appointments   Date Time Provider Department Center   7/8/2024  1:45 PM Ivy Horner MD [id number]  [place] [address]         Reason for call: Dizziness  Onset: Data Unavailable    Spoke to daughter (Kendra), on release, verified name and date of birth. She said that patient's blood pressure has been stable but she has a history of orthostatic hypotension and will get episodes of dizziness when she lays down at night. She is requesting labs. Rn recommended appointment. She does not drink a lot of fluids. Rn encouraged to drink more.     She also wants the Shingles vaccine. She will discuss this with Dr. Horner on Monday.      Reason for Disposition   Dizziness not present now, but is a chronic symptom (recurrent or ongoing AND lasting > 4 weeks)    Protocols used: Dizziness-A-OH

## 2024-07-08 ENCOUNTER — OFFICE VISIT (OUTPATIENT)
Dept: INTERNAL MEDICINE CLINIC | Facility: CLINIC | Age: 89
End: 2024-07-08

## 2024-07-08 VITALS
OXYGEN SATURATION: 96 % | DIASTOLIC BLOOD PRESSURE: 76 MMHG | WEIGHT: 165 LBS | BODY MASS INDEX: 30.36 KG/M2 | SYSTOLIC BLOOD PRESSURE: 132 MMHG | HEART RATE: 66 BPM | TEMPERATURE: 98 F | HEIGHT: 62 IN

## 2024-07-08 DIAGNOSIS — G56.01 CARPAL TUNNEL SYNDROME OF RIGHT WRIST: Primary | ICD-10-CM

## 2024-07-08 PROCEDURE — 90750 HZV VACC RECOMBINANT IM: CPT | Performed by: INTERNAL MEDICINE

## 2024-07-08 PROCEDURE — 90471 IMMUNIZATION ADMIN: CPT | Performed by: INTERNAL MEDICINE

## 2024-07-08 PROCEDURE — 1159F MED LIST DOCD IN RCRD: CPT | Performed by: INTERNAL MEDICINE

## 2024-07-08 PROCEDURE — 3078F DIAST BP <80 MM HG: CPT | Performed by: INTERNAL MEDICINE

## 2024-07-08 PROCEDURE — 99214 OFFICE O/P EST MOD 30 MIN: CPT | Performed by: INTERNAL MEDICINE

## 2024-07-08 PROCEDURE — 3075F SYST BP GE 130 - 139MM HG: CPT | Performed by: INTERNAL MEDICINE

## 2024-07-08 PROCEDURE — 3008F BODY MASS INDEX DOCD: CPT | Performed by: INTERNAL MEDICINE

## 2024-07-08 PROCEDURE — 1126F AMNT PAIN NOTED NONE PRSNT: CPT | Performed by: INTERNAL MEDICINE

## 2024-07-08 NOTE — PROGRESS NOTES
Subjective:     Patient ID: Emily Fernandez is a 89 year old female.    Dizziness  Associated symptoms include numbness.   Numbness  Associated symptoms include numbness.       History/Other:   She came in today for follow-up.    She still has intermittent dizziness which is not constant and is much better than before usually happens when she is laying down or when she is changing position fast.  She also states states that she noticed that she does have some     tingling signs and numbness sensation in fingertips of her right hand.  No pain.      Review of Systems   Constitutional: Negative.    HENT: Negative.     Eyes: Negative.    Respiratory: Negative.     Cardiovascular: Negative.    Gastrointestinal: Negative.    Endocrine: Negative.    Genitourinary: Negative.    Musculoskeletal: Negative.    Skin: Negative.    Neurological:  Positive for dizziness and numbness.   Hematological: Negative.      Current Outpatient Medications   Medication Sig Dispense Refill    miconazole 2 % External Powder Apply 1 each topically as needed for Itching. 3 each 3    acetaminophen 500 MG Oral Tab Take 1 tablet (500 mg total) by mouth every 6 (six) hours as needed for Pain.      docusate sodium 100 MG Oral Cap Take 1 capsule (100 mg total) by mouth 2 (two) times daily.      amLODIPine 2.5 MG Oral Tab Take 1 tablet (2.5 mg total) by mouth daily as needed (if SBP>150).      metoprolol succinate ER 50 MG Oral Tablet 24 Hr Take 1 tablet (50 mg total) by mouth Daily Beta Blocker. 30 tablet 0    apixaban 5 MG Oral Tab Take 1 tablet (5 mg total) by mouth 2 (two) times daily.      atorvastatin 40 MG Oral Tab Take 1 tablet (40 mg total) by mouth nightly.      meclizine 12.5 MG Oral Tab Take 1 tablet (12.5 mg total) by mouth 3 (three) times daily as needed for Dizziness. (Patient not taking: Reported on 7/8/2024) 20 tablet 0     Allergies:  Allergies   Allergen Reactions    Latex ITCHING    Sulfa Antibiotics ITCHING       Past Medical  History:    Arrhythmia    Diabetes (HCC)    Essential hypertension    Hearing impairment    Hypokalemia    Visual impairment      Past Surgical History:   Procedure Laterality Date    Hip replacement surgery      Removal gallbladder        Family History   Problem Relation Age of Onset    No Known Problems Father     No Known Problems Mother     Cancer Daughter       Social History:   Social History     Socioeconomic History    Marital status:    Tobacco Use    Smoking status: Never    Smokeless tobacco: Never   Vaping Use    Vaping status: Never Used   Substance and Sexual Activity    Alcohol use: Never    Drug use: Never   Other Topics Concern    Grew up on a farm No    History of tanning Yes    Outdoor occupation No    Breast feeding No    Reaction to local anesthetic No    Pt has a pacemaker No    Pt has a defibrillator No     Social Determinants of Health     Food Insecurity: No Food Insecurity (10/7/2023)    Food Insecurity     Food Insecurity: Never true   Transportation Needs: No Transportation Needs (10/7/2023)    Transportation Needs     Lack of Transportation: No   Housing Stability: Low Risk  (10/7/2023)    Housing Stability     Housing Instability: No        Objective:   Physical Exam  Vitals and nursing note reviewed.   Constitutional:       Appearance: Normal appearance.   HENT:      Head: Normocephalic and atraumatic.   Cardiovascular:      Rate and Rhythm: Normal rate and regular rhythm.      Pulses: Normal pulses.      Heart sounds: Normal heart sounds.   Pulmonary:      Effort: Pulmonary effort is normal.      Breath sounds: Normal breath sounds.   Abdominal:      Palpations: Abdomen is soft.   Musculoskeletal:         General: Normal range of motion.      Cervical back: Normal range of motion and neck supple.   Skin:     General: Skin is warm.   Neurological:      Mental Status: She is alert.         Assessment & Plan:   No diagnosis found.  Intermittent dizziness could be benign  positional vertigo I did explain that she needs to be very careful with changing position, risk for fall    Right hand tingling could be carpal tunnel I did advise her to use wrist splint at night if not better follow-up  No orders of the defined types were placed in this encounter.      Meds This Visit:  Requested Prescriptions      No prescriptions requested or ordered in this encounter       Imaging & Referrals:  None

## 2024-07-09 PROBLEM — K62.5 RECTAL BLEEDING: Status: RESOLVED | Noted: 2023-10-07 | Resolved: 2024-07-09

## 2024-07-11 ENCOUNTER — TELEPHONE (OUTPATIENT)
Dept: INTERNAL MEDICINE CLINIC | Facility: CLINIC | Age: 89
End: 2024-07-11

## 2024-07-11 NOTE — TELEPHONE ENCOUNTER
Patients daughter called and said the nurse from [facility], visited the patient and asked for the patient to be re instated to home health care.     [facility] phone number: [phone]  [facility] nurse phone number: [phone]    FAX: [phone]

## 2024-07-12 NOTE — TELEPHONE ENCOUNTER
Nay with [facility] calling to request a copy of the patients most recent progress notes.    Fax [phone]

## 2024-07-15 ENCOUNTER — TELEPHONE (OUTPATIENT)
Dept: INTERNAL MEDICINE CLINIC | Facility: CLINIC | Age: 89
End: 2024-07-15

## 2024-07-15 NOTE — TELEPHONE ENCOUNTER
Nay from [facility] called to check if face to face form was received through fax, was sent on 7/12     FAX: [phone]

## 2024-07-16 NOTE — TELEPHONE ENCOUNTER
Nay from [facility] called to check if the face-to-face form was received. She is requesting a callback.

## 2024-07-23 NOTE — TELEPHONE ENCOUNTER
Nay with [facility] called back to follow up on the face to face notes.     Home Health advised patient has been calling them to check status on services.     Home Health is awaiting the form faxed back.    Fax # [phone]

## 2024-07-23 NOTE — TELEPHONE ENCOUNTER
All paperwork has been faxed back, [facility] has been informed, ernesto states that they did not receive it. She will fax again and put it to my attention.

## 2024-07-25 ENCOUNTER — TELEPHONE (OUTPATIENT)
Dept: INTERNAL MEDICINE CLINIC | Facility: CLINIC | Age: 89
End: 2024-07-25

## 2024-07-25 NOTE — TELEPHONE ENCOUNTER
Please see encounter from 7/15 Nay calling from [facility] requesting to speak to zainab.     Checking status and see if forms completed and if they can be faxed can reach out if any questions       FAX: [phone]

## 2024-08-05 ENCOUNTER — MED REC SCAN ONLY (OUTPATIENT)
Dept: INTERNAL MEDICINE CLINIC | Facility: CLINIC | Age: 89
End: 2024-08-05

## 2024-09-24 ENCOUNTER — MED REC SCAN ONLY (OUTPATIENT)
Dept: INTERNAL MEDICINE CLINIC | Facility: CLINIC | Age: 89
End: 2024-09-24

## 2024-10-08 ENCOUNTER — TELEPHONE (OUTPATIENT)
Dept: INTERNAL MEDICINE CLINIC | Facility: CLINIC | Age: 89
End: 2024-10-08

## 2024-10-09 ENCOUNTER — APPOINTMENT (OUTPATIENT)
Dept: GENERAL RADIOLOGY | Facility: HOSPITAL | Age: 89
End: 2024-10-09
Attending: EMERGENCY MEDICINE
Payer: MEDICARE

## 2024-10-09 ENCOUNTER — HOSPITAL ENCOUNTER (INPATIENT)
Facility: HOSPITAL | Age: 89
LOS: 2 days | Discharge: HOME | End: 2024-10-11
Attending: EMERGENCY MEDICINE | Admitting: INTERNAL MEDICINE
Payer: MEDICARE

## 2024-10-09 ENCOUNTER — APPOINTMENT (OUTPATIENT)
Dept: INTERVENTIONAL RADIOLOGY/VASCULAR | Facility: HOSPITAL | Age: 89
End: 2024-10-09
Attending: INTERNAL MEDICINE
Payer: MEDICARE

## 2024-10-09 ENCOUNTER — APPOINTMENT (OUTPATIENT)
Dept: CV DIAGNOSTICS | Facility: HOSPITAL | Age: 89
End: 2024-10-09
Attending: INTERNAL MEDICINE
Payer: MEDICARE

## 2024-10-09 DIAGNOSIS — R07.9 CHEST PAIN, UNSPECIFIED TYPE: Primary | ICD-10-CM

## 2024-10-09 DIAGNOSIS — R79.89 ELEVATED TROPONIN: ICD-10-CM

## 2024-10-09 LAB
ANION GAP SERPL CALC-SCNC: 9 MMOL/L (ref 0–18)
ATRIAL RATE: 69 BPM
BASOPHILS # BLD AUTO: 0.04 X10(3) UL (ref 0–0.2)
BASOPHILS NFR BLD AUTO: 0.5 %
BUN BLD-MCNC: 25 MG/DL (ref 9–23)
BUN/CREAT SERPL: 23.6 (ref 10–20)
CALCIUM BLD-MCNC: 9.9 MG/DL (ref 8.7–10.4)
CHLORIDE SERPL-SCNC: 110 MMOL/L (ref 98–112)
CHOLEST SERPL-MCNC: 160 MG/DL (ref ?–200)
CO2 SERPL-SCNC: 25 MMOL/L (ref 21–32)
CREAT BLD-MCNC: 1.06 MG/DL
DEPRECATED RDW RBC AUTO: 42.5 FL (ref 35.1–46.3)
EGFRCR SERPLBLD CKD-EPI 2021: 50 ML/MIN/1.73M2 (ref 60–?)
EOSINOPHIL # BLD AUTO: 0.11 X10(3) UL (ref 0–0.7)
EOSINOPHIL NFR BLD AUTO: 1.4 %
ERYTHROCYTE [DISTWIDTH] IN BLOOD BY AUTOMATED COUNT: 12.8 % (ref 11–15)
GLUCOSE BLD-MCNC: 151 MG/DL (ref 70–99)
GLUCOSE BLDC GLUCOMTR-MCNC: 115 MG/DL (ref 70–99)
GLUCOSE BLDC GLUCOMTR-MCNC: 90 MG/DL (ref 70–99)
GLUCOSE BLDC GLUCOMTR-MCNC: 94 MG/DL (ref 70–99)
HCT VFR BLD AUTO: 46.2 %
HDLC SERPL-MCNC: 45 MG/DL (ref 40–59)
HGB BLD-MCNC: 15.3 G/DL
IMM GRANULOCYTES # BLD AUTO: 0.02 X10(3) UL (ref 0–1)
IMM GRANULOCYTES NFR BLD: 0.3 %
ISTAT ACTIVATED CLOTTING TIME: 226 SECONDS (ref 125–137)
ISTAT ACTIVATED CLOTTING TIME: 287 SECONDS (ref 125–137)
LDLC SERPL CALC-MCNC: 90 MG/DL (ref ?–100)
LYMPHOCYTES # BLD AUTO: 2.61 X10(3) UL (ref 1–4)
LYMPHOCYTES NFR BLD AUTO: 33.5 %
MCH RBC QN AUTO: 30.3 PG (ref 26–34)
MCHC RBC AUTO-ENTMCNC: 33.1 G/DL (ref 31–37)
MCV RBC AUTO: 91.5 FL
MONOCYTES # BLD AUTO: 0.62 X10(3) UL (ref 0.1–1)
MONOCYTES NFR BLD AUTO: 8 %
NEUTROPHILS # BLD AUTO: 4.39 X10 (3) UL (ref 1.5–7.7)
NEUTROPHILS # BLD AUTO: 4.39 X10(3) UL (ref 1.5–7.7)
NEUTROPHILS NFR BLD AUTO: 56.3 %
NONHDLC SERPL-MCNC: 115 MG/DL (ref ?–130)
OSMOLALITY SERPL CALC.SUM OF ELEC: 305 MOSM/KG (ref 275–295)
P AXIS: 60 DEGREES
P-R INTERVAL: 188 MS
PLATELET # BLD AUTO: 199 10(3)UL (ref 150–450)
POTASSIUM SERPL-SCNC: 4.2 MMOL/L (ref 3.5–5.1)
Q-T INTERVAL: 340 MS
Q-T INTERVAL: 420 MS
QRS DURATION: 84 MS
QRS DURATION: 88 MS
QTC CALCULATION (BEZET): 443 MS
QTC CALCULATION (BEZET): 450 MS
R AXIS: -36 DEGREES
R AXIS: -36 DEGREES
RBC # BLD AUTO: 5.05 X10(6)UL
SODIUM SERPL-SCNC: 144 MMOL/L (ref 136–145)
T AXIS: -12 DEGREES
T AXIS: 1 DEGREES
TRIGL SERPL-MCNC: 145 MG/DL (ref 30–149)
TROPONIN I SERPL HS-MCNC: 132 NG/L
TROPONIN I SERPL HS-MCNC: 169 NG/L
TROPONIN I SERPL HS-MCNC: 171 NG/L
TROPONIN I SERPL HS-MCNC: 182 NG/L
TROPONIN I SERPL HS-MCNC: 99 NG/L
VENTRICULAR RATE: 102 BPM
VENTRICULAR RATE: 69 BPM
VLDLC SERPL CALC-MCNC: 23 MG/DL (ref 0–30)
WBC # BLD AUTO: 7.8 X10(3) UL (ref 4–11)

## 2024-10-09 PROCEDURE — 99223 1ST HOSP IP/OBS HIGH 75: CPT | Performed by: INTERNAL MEDICINE

## 2024-10-09 PROCEDURE — 93306 TTE W/DOPPLER COMPLETE: CPT | Performed by: INTERNAL MEDICINE

## 2024-10-09 PROCEDURE — 71045 X-RAY EXAM CHEST 1 VIEW: CPT | Performed by: EMERGENCY MEDICINE

## 2024-10-09 PROCEDURE — B2111ZZ FLUOROSCOPY OF MULTIPLE CORONARY ARTERIES USING LOW OSMOLAR CONTRAST: ICD-10-PCS | Performed by: INTERNAL MEDICINE

## 2024-10-09 PROCEDURE — 4A023N7 MEASUREMENT OF CARDIAC SAMPLING AND PRESSURE, LEFT HEART, PERCUTANEOUS APPROACH: ICD-10-PCS | Performed by: INTERNAL MEDICINE

## 2024-10-09 PROCEDURE — 02JA3ZZ INSPECTION OF HEART, PERCUTANEOUS APPROACH: ICD-10-PCS | Performed by: INTERNAL MEDICINE

## 2024-10-09 RX ORDER — METOPROLOL SUCCINATE 50 MG/1
50 TABLET, EXTENDED RELEASE ORAL
Status: DISCONTINUED | OUTPATIENT
Start: 2024-10-09 | End: 2024-10-11

## 2024-10-09 RX ORDER — ISOSORBIDE MONONITRATE 30 MG/1
30 TABLET, EXTENDED RELEASE ORAL DAILY
Status: DISCONTINUED | OUTPATIENT
Start: 2024-10-09 | End: 2024-10-11

## 2024-10-09 RX ORDER — NITROGLYCERIN 20 MG/100ML
INJECTION INTRAVENOUS
Status: COMPLETED
Start: 2024-10-09 | End: 2024-10-09

## 2024-10-09 RX ORDER — ONDANSETRON 2 MG/ML
4 INJECTION INTRAMUSCULAR; INTRAVENOUS EVERY 6 HOURS PRN
Status: DISCONTINUED | OUTPATIENT
Start: 2024-10-09 | End: 2024-10-11

## 2024-10-09 RX ORDER — CHLORHEXIDINE GLUCONATE 40 MG/ML
SOLUTION TOPICAL
Status: ACTIVE | OUTPATIENT
Start: 2024-10-10 | End: 2024-10-10

## 2024-10-09 RX ORDER — MIDAZOLAM HYDROCHLORIDE 1 MG/ML
INJECTION INTRAMUSCULAR; INTRAVENOUS
Status: COMPLETED
Start: 2024-10-09 | End: 2024-10-09

## 2024-10-09 RX ORDER — NITROGLYCERIN 0.4 MG/1
0.4 TABLET SUBLINGUAL EVERY 5 MIN PRN
Status: DISCONTINUED | OUTPATIENT
Start: 2024-10-09 | End: 2024-10-11

## 2024-10-09 RX ORDER — IOPAMIDOL 612 MG/ML
200 INJECTION, SOLUTION INTRAVASCULAR
Status: COMPLETED | OUTPATIENT
Start: 2024-10-09 | End: 2024-10-09

## 2024-10-09 RX ORDER — VERAPAMIL HYDROCHLORIDE 2.5 MG/ML
INJECTION, SOLUTION INTRAVENOUS
Status: COMPLETED
Start: 2024-10-09 | End: 2024-10-09

## 2024-10-09 RX ORDER — LIDOCAINE HYDROCHLORIDE 20 MG/ML
INJECTION, SOLUTION EPIDURAL; INFILTRATION; INTRACAUDAL; PERINEURAL
Status: COMPLETED
Start: 2024-10-09 | End: 2024-10-09

## 2024-10-09 RX ORDER — ASPIRIN 325 MG
325 TABLET ORAL DAILY
Status: DISCONTINUED | OUTPATIENT
Start: 2024-10-09 | End: 2024-10-09

## 2024-10-09 RX ORDER — HEPARIN SODIUM 1000 [USP'U]/ML
INJECTION, SOLUTION INTRAVENOUS; SUBCUTANEOUS
Status: COMPLETED
Start: 2024-10-09 | End: 2024-10-09

## 2024-10-09 RX ORDER — ASPIRIN 81 MG/1
81 TABLET ORAL DAILY
Status: DISCONTINUED | OUTPATIENT
Start: 2024-10-10 | End: 2024-10-11

## 2024-10-09 RX ORDER — ATORVASTATIN CALCIUM 40 MG/1
40 TABLET, FILM COATED ORAL NIGHTLY
Status: DISCONTINUED | OUTPATIENT
Start: 2024-10-09 | End: 2024-10-11

## 2024-10-09 RX ORDER — SODIUM CHLORIDE 9 MG/ML
INJECTION, SOLUTION INTRAVENOUS
Status: ACTIVE | OUTPATIENT
Start: 2024-10-10 | End: 2024-10-10

## 2024-10-09 RX ORDER — CLOPIDOGREL BISULFATE 75 MG/1
75 TABLET ORAL DAILY
Status: DISCONTINUED | OUTPATIENT
Start: 2024-10-10 | End: 2024-10-11

## 2024-10-09 NOTE — TELEPHONE ENCOUNTER
Name:NIKKIE                      10/9/2024 8:01:41 AM  ProfileId:  [id number]                   PagerID       [phone]  Department:[facility]  ======================================================================  Paging    Message # [phone]         10/08/2024 04:39p   [DELMAR]  To:  From:  DERIK Rowley MD:  Phone#:  ----------------------------------------------------------------------  KADE CHEN [facility] 32 [phone] HAS A HEADACHE AND SHE'S ON BLOOD THINNERS WANTS TO KNOW IF IT'S OK TO TAKE AN ASPIRIN Paged at number :  PAGE: [phone] at : Oct- 16:39

## 2024-10-09 NOTE — H&P
[facility]  part of [facility]                                                                                                          History and Physical     Emily Fernandez Patient Status:  Emergency    1932 MRN [id number]   Location [facility] EMERGENCY DEPARTMENT Attending Catracho Gutierrez MD   Hosp Day # 0 PCP Zacarias Horner MD       Chief Complaint: Chest pain    Subjective:    Emily Fernandez is a 89 year old female with history of DM, HTN, PAF, CVA, CKD, CHF, aortic stenosis, ascending aortic aneurysm, HLD who presented to the ED with complaints of chest pain.  Onset 1 day, waxing and waning through the previous day.  Overnight, she appeared pale and clammy to daughter.  She was noted with elevated  BP and was advised to come to the ED by PCP.  She also had a headache during the day that resolved with Tylenol.  No shortness of breath, lower extremity swelling.  No cough, fever or chills.  Vital stable  Labs significant for troponin 99--->132  ECG: NSR, LAD.  Nonspecific T wave abnormalities in lateral leads.    Cardiology consulted.  Patient to be admitted for further evaluation and treatment.  At the time of my evaluation, patient reports being chest pain-free.  She notes she chewed a couple of aspirin prior to presenting to the ED.    Patient recent cardiology visit 2024 reviewed, no acute issues identified at the time.  History of prior cardiac events.    History/Other:      Past Medical History:  Past Medical History:    Arrhythmia    Diabetes (HCC)    Essential hypertension    Hearing impairment    Hypokalemia    Visual impairment        Past Surgical History:   Past Surgical History:   Procedure Laterality Date    Hip replacement surgery      Removal gallbladder         Social History:  reports that she has never smoked. She has never used smokeless tobacco. She reports that she does not drink alcohol and does not use drugs.    Family History:   Family History    Problem Relation Age of Onset    No Known Problems Father     No Known Problems Mother     Cancer Daughter        Allergies: Allergies[1]    Medications:  Medications Ordered Prior to Encounter[2]    Review of Systems:   A comprehensive 14 point review of systems was completed.    Pertinent positives and negatives noted in the HPI.    Objective:     /63   Pulse 68   Resp 19   SpO2 95%   General: No acute distress.    HEENT: Normocephalic, atraumatic.  Neck: Supple, no JVD.  Respiratory: Normal effort.  CTAB  Cardiovascular: S1, S2 regular.  Normal rate.   Abdomen: Soft, not tender or distended.  Neurologic: Alert, oriented x 3.  Nonfocal.  Musculoskeletal: No tenderness or deformity.  Extremities: No edema  Psychiatric: Cooperative and appropriate    Results:      Labs:  Labs Last 24 Hours:   BMP     CBC    Other     Na 144 Cl 110 BUN 25 Glu 151   Hb 15.3   PTT - Procal -   K 4.2 CO2 25.0 Cr 1.06   WBC 7.8 >< .0  INR - CRP -   Renal Lytes Endo    Hct 46.2   Trop - D dim -   eGFR - Ca 9.9 POC Gluc  -    LFT   pBNP - Lactic -   eGFR AA - PO4 - A1c -   AST - APk - Prot -  LDL -     Mg - TSH -   ALT - T esdras - Alb -          COVID-19 Lab Results    COVID-19  Lab Results   Component Value Date    COVID19 Not Detected 02/21/2023       Pro-Calcitonin  No results for input(s): \"PCT\" in the last 168 hours.    Cardiac  No results for input(s): \"TROP\", \"PBNP\" in the last 168 hours.    Creatinine Kinase  No results for input(s): \"CK\" in the last 168 hours.    Inflammatory Markers  No results for input(s): \"CRP\", \"RICARDO\", \"LDH\", \"DDIMER\" in the last 168 hours.    Imaging: Imaging data reviewed in Epic.    Assessment & Plan:    Chest pain/elevated troponin/NSTEMI  -Admit  -Serial troponin  -Telemetry  -No heparin since patient on Eliquis  -Cardiology on consult, await evaluation recommendation  -Aspirin, as needed nitro, statin, beta-blocker    History of PAF on Eliquis  -Telemetry  -Resume home medications when  reconciled    DM  -Not on medications    HTN  CKD  CAD  History of CHF  History of CVA  -Resume home medications when reconciled    Quality:  DVT Prophylaxis: Hold Eliquis pending cardiology eval  CODE status: Full code, presumed  MICHAEL: TBD      Plan of care discussed with patient. Acknowledged understanding and agrees to plan. Also discussed with the ED physician.    High MDM  Time spent on this admission - examining patient, obtaining history, reviewing previous medical records, going over test results/imaging and discussing plan of care. More than 50% of the time was spent in counseling and/or coordination of care related to NSTEMI/chest pain.   All questions answered.     Tatiana Morillo MD  10/9/2024                   [1]   Allergies  Allergen Reactions    Latex ITCHING    Sulfa Antibiotics ITCHING   [2]   No current facility-administered medications on file prior to encounter.     Current Outpatient Medications on File Prior to Encounter   Medication Sig Dispense Refill    miconazole 2 % External Powder Apply 1 each topically as needed for Itching. 3 each 3    acetaminophen 500 MG Oral Tab Take 1 tablet (500 mg total) by mouth every 6 (six) hours as needed for Pain.      docusate sodium 100 MG Oral Cap Take 1 capsule (100 mg total) by mouth 2 (two) times daily.      amLODIPine 2.5 MG Oral Tab Take 1 tablet (2.5 mg total) by mouth daily as needed (if SBP>150).      metoprolol succinate ER 50 MG Oral Tablet 24 Hr Take 1 tablet (50 mg total) by mouth Daily Beta Blocker. 30 tablet 0    meclizine 12.5 MG Oral Tab Take 1 tablet (12.5 mg total) by mouth 3 (three) times daily as needed for Dizziness. (Patient not taking: Reported on 7/8/2024) 20 tablet 0    apixaban 5 MG Oral Tab Take 1 tablet (5 mg total) by mouth 2 (two) times daily.      atorvastatin 40 MG Oral Tab Take 1 tablet (40 mg total) by mouth nightly.

## 2024-10-09 NOTE — ED PROVIDER NOTES
Patient Seen in: [facility] Emergency Department      History   No chief complaint on file.    Stated Complaint: chest tightness    Subjective:   HPI    89-year-old female with history of hypertension, diabetes, and atrial fibrillation presently on Eliquis presents with complaints of chest pain.  The patient reports waxing and waning pain to her anterior chest throughout the day today.  The patient's daughter states that tonight she appeared pale and clammy.  The patient's daughter checked the patient's blood pressure and found it to be elevated.  She called the patient's primary physician who advised that she come to the ED for evaluation.  The patient also reported a headache earlier today.  She was given Tylenol by her daughter and states that headache has resolved.  The patient also states that her chest pain has resolved at present.  She reports feeling a mild pressure-like sensation to her anterior chest but denies any pain.  She denies any associated dyspnea.  She denies leg pain or swelling.  She denies cough, fever, or chills.  The patient's history is obtained primarily from the patient's daughter at the bedside with additional history given by the patient.    Objective:     No pertinent past medical history.            No pertinent past surgical history.              No pertinent social history.                Physical Exam     ED Triage Vitals [10/09/24 0330]   /87   Pulse 72   Resp 18   Temp    Temp src    SpO2 94 %   O2 Device None (Room air)       Current Vitals:   Vital Signs  BP: 139/87  Pulse: 72  Resp: 18  MAP (mmHg): (!) 104    Oxygen Therapy  SpO2: 94 %  O2 Device: None (Room air)        Physical Exam    General Appearance: alert, no distress  Eyes: pupils equal and round no pallor or injection  ENT, Mouth: mucous membranes moist  Respiratory: there are no retractions, lungs are clear to auscultation  Cardiovascular: Irregular rhythm with normal rate  Gastrointestinal:  abdomen is  soft and non tender, no masses, bowel sounds normal  Neurological: Speech normal.  Moving extremities x 4.  Skin: warm and dry, no rashes.  Musculoskeletal: neck is supple non tender        Extremities are symmetrical, full range of motion.  No leg edema or tenderness noted.  Psychiatric: patient is oriented X 3, there is no agitation    DIFFERENTIAL DIAGNOSIS: After history and physical exam differential diagnosis was considered for chest pain including chest wall pain, myocardial ischemia, pulmonary embolus and pulmonary infectious process.        ED Course     Labs Reviewed   BASIC METABOLIC PANEL (8) - Abnormal; Notable for the following components:       Result Value    Glucose 151 (*)     BUN 25 (*)     Creatinine 1.06 (*)     BUN/CREA Ratio 23.6 (*)     Calculated Osmolality 305 (*)     eGFR-Cr 50 (*)     All other components within normal limits   TROPONIN I HIGH SENSITIVITY - Abnormal; Notable for the following components:    Troponin I (High Sensitivity) 99 (*)     All other components within normal limits   LIPID PANEL - Normal   CBC WITH DIFFERENTIAL WITH PLATELET   TROPONIN I HIGH SENSITIVITY               EKG    Rate, intervals and axes as noted on EKG Report.  Rate: 102  Rhythm: Atrial Fibrillation  Axis: Left  Reading: Atrial fibrillation with tachycardic rate, no acute ischemic changes       2-hour EKG:  EKG    Rate, intervals and axes as noted on EKG Report.  Rate: 69  Rhythm: Sinus Rhythm  Axis: Left  Reading: Nonspecific EKG            MDM      Lab, x-ray, and EKG results noted.  Patient without chest pain during her ED stay.  Elevated troponin.  The patient is presently on anticoagulation.  Plan to admit to cardiac telemetry for monitoring and further evaluation.  Communicated with Dr. Glover, hospitalist.  Also communicated with Dr. Morillo, hospitalist.                Admission disposition: 10/9/2024  4:52 AM           Medical Decision Making      Disposition and Plan     Clinical  Impression:  1. Chest pain, unspecified type    2. Elevated troponin         Disposition:  Admit  10/9/2024  4:52 am    Follow-up:  No follow-up provider specified.  We recommend that you schedule follow up care with a primary care provider within the next three months to obtain basic health screening including reassessment of your blood pressure.      Medications Prescribed:  Current Discharge Medication List              Supplementary Documentation:         Hospital Problems       Present on Admission  Date Reviewed: 7/9/2024            ICD-10-CM Noted POA    * (Principal) Chest pain, unspecified type R07.9 10/9/2024 Unknown

## 2024-10-09 NOTE — SPIRITUAL CARE NOTE
Spiritual Care Visit Note    Patient Name: Emily Fernandez Date of Spiritual Care Visit: 10/09/24   : 1932 Primary Dx: Chest pain, unspecified type       Referred By:      Spiritual Care Taxonomy:    Intended Effects: Aligning care plan with patient's values    Methods: Assist with spiritual/Congregation practices;Collaborate with care team member;Demonstrate acceptance;Offer spiritual/Congregation support    Interventions: Acknowledge current situation;Acknowledge response to difficult experience;Active listening;Ask guided questions;Ask guided questions about citlalli;Explain  role;Prayer for healing;Discuss frustrations with someone;Discuss coping mechanism with someone;Discuss concerns    Visit Type/Summary:     - Spiritual Care: Responded to a request for spiritual care and assessed the patient for spiritual care needs. Consulted with RN prior to visit. Offered empathic listening and emotional support.    Spiritual Care support can be requested via an Epic consult. For urgent/immediate needs, please contact the On Call  at: [facility]: ext [phone]    Chaplain Resident, Martita Arias, PhD

## 2024-10-09 NOTE — BRIEF OP NOTE
Preop diagnosis: NSTEMI  Post op diagnosis: CAD  Procedures: [facility] COR ANGIO PTCA OF RCA  Findings: 90-95% chronic appearing rca lesion with collaterals from circumflex. I was able to cross this lesion but even the smallest balloon was able to dilate upto the proximal portion of the lesion but unable to cross fully. LAD has heavily calcified mid lesion and then 2 other tandem calcific and fibrotic LAD lesion. Vessel is too small for rotablator and I doubt we will get any balloons to cross. Will discuss with colleagues to see if repeat attempt at RCA with larger sheath and guides would help. LCx is without obstruction.  EBL: 20 mls  Specimens: None

## 2024-10-09 NOTE — CONSULTS
HPI  89-year-old female presenting with one day of waxing and waning chest pain that resolved prior to presentation to the emergency department. Troponin significantly elevated from 99 to 132. Blood pressure elevated. Patient also complained of headache. No cough, fever, chills, shortness of breath, or worsening edema.    Past medical history: Hypertension, diabetes, atrial fibrillation, CVA, chronic kidney disease, aortic stenosis, ascending aortic aneurysm, hyperlipidemia  Allergies: Latex, sulfa antibiotics   Social history: Non-smoker, no alcohol use  Cardiac medications: metoprolol 50 mg daily, Eliquis 5 mg BID, Atorvastatin 40 mg daily, Amlodipine 2.5 mg daily    ROS  A comprehensive 12-point review of systems is otherwise negative.    Physical Exam  Vitals: /74, HR 68, RR 19, SpO2 95% on room air  Constitutional: Alert and oriented, no acute distress    Psychiatric: Coherent and appropriate    Eyes: Anicteric    Neck: No JVD or carotid bruits    Lungs: Clear to auscultation bilaterally    Cardiovascular: Regular rate and rhythm, no murmurs, rubs or gallops; no lower extremity edema    Peripheral Pulses: diminished bilaterally    Abdomen: Soft, non-tender, non-distended    Musculoskeletal: No clubbing, cyanosis or edema    Neurologic: No focal deficits    Skin: Warm and dry, no rashes or lesions    Pertinent diagnostic findings: Troponin 132, Hgb 15.3, Plt 199, LDL 90; EKG with sinus rhythm, left axis deviation, nonspecific T wave changes in lateral leads    Assessment / Plan  1. Non-ST elevation myocardial infarction with lateral T wave inversion -  Currently chest pain free. Proceed with coronary angiography this afternoon with possible PCI if obstructive coronary artery disease identified. Continue aspirin, atorvastatin, and metoprolol. Hold Eliquis.    I21.4 Non-ST elevation (NSTEMI) myocardial infarction    2. Paroxysmal atrial fibrillation -  On propranolol and Eliquis with history of CVA. Hold  Eliquis prior to angiography.    I48.0 Paroxysmal atrial fibrillation    3. Hypertension -   BP mildly elevated on presentation, continue home antihypertensive regimen.    I10 Essential (primary) hypertension    4. Diabetes mellitus -  Continue home diabetes medications.    E11.9 Type 2 diabetes mellitus without complications    5. Chronic kidney disease -   Monitor renal function pre and post angiography.    N18.9 Chronic kidney disease, unspecified    6. Aortic stenosis -  By listed history. Echocardiogram last year did not show it. There was mild calcification.    I35.0 Nonrheumatic aortic (valve) stenosis    7. Ascending aortic aneurysm -   Hemodynamically stable, continue surveillance imaging.    I71.2 Thoracic aortic aneurysm, without rupture    Discussion Notes  Will proceed with coronary angiography this afternoon around 4 PM if patient agrees. Discussed risks, benefits and alternatives with patient including bleeding, infection, vascular injury, stroke, MI and death. Obtained informed consent. Continue aspirin, atorvastatin and metoprolol. Hold Eliquis prior to procedure. Awaiting TTE to evaluate cardiac function. Echo report from last year reviewed. NPO after breakfast in preparation for procedure.    Roberto Glover DO [facility]  [address]  [place] [address]  [phone]

## 2024-10-09 NOTE — PROGRESS NOTES
Pt A&O4. On room air. Ambulates standby assist with walker. Denies pain. Admitted to the floor. Safety precautions in place.

## 2024-10-09 NOTE — ED QUICK NOTES
Orders for admission, patient is aware of plan and ready to go upstairs. Any questions, please call ED JOHNNY Theodore at extension [phone].     Patient Covid vaccination status: Fully vaccinated     COVID Test Ordered in ED: None    COVID Suspicion at Admission: N/A    Running Infusions:  None    Mental Status/LOC at time of transport: A&Ox4    Other pertinent information:   CIWA score: N/A   NIH score:  N/A

## 2024-10-10 LAB
ANION GAP SERPL CALC-SCNC: 1 MMOL/L (ref 0–18)
BUN BLD-MCNC: 27 MG/DL (ref 9–23)
BUN/CREAT SERPL: 25 (ref 10–20)
CALCIUM BLD-MCNC: 9.8 MG/DL (ref 8.7–10.4)
CHLORIDE SERPL-SCNC: 111 MMOL/L (ref 98–112)
CO2 SERPL-SCNC: 29 MMOL/L (ref 21–32)
CREAT BLD-MCNC: 1.08 MG/DL
DEPRECATED RDW RBC AUTO: 44.4 FL (ref 35.1–46.3)
EGFRCR SERPLBLD CKD-EPI 2021: 48 ML/MIN/1.73M2 (ref 60–?)
ERYTHROCYTE [DISTWIDTH] IN BLOOD BY AUTOMATED COUNT: 12.8 % (ref 11–15)
GLUCOSE BLD-MCNC: 107 MG/DL (ref 70–99)
GLUCOSE BLDC GLUCOMTR-MCNC: 102 MG/DL (ref 70–99)
GLUCOSE BLDC GLUCOMTR-MCNC: 104 MG/DL (ref 70–99)
GLUCOSE BLDC GLUCOMTR-MCNC: 109 MG/DL (ref 70–99)
GLUCOSE BLDC GLUCOMTR-MCNC: 117 MG/DL (ref 70–99)
HCT VFR BLD AUTO: 38.9 %
HGB BLD-MCNC: 12.7 G/DL
MCH RBC QN AUTO: 30.9 PG (ref 26–34)
MCHC RBC AUTO-ENTMCNC: 32.6 G/DL (ref 31–37)
MCV RBC AUTO: 94.6 FL
OSMOLALITY SERPL CALC.SUM OF ELEC: 298 MOSM/KG (ref 275–295)
PLATELET # BLD AUTO: 203 10(3)UL (ref 150–450)
POTASSIUM SERPL-SCNC: 4.9 MMOL/L (ref 3.5–5.1)
RBC # BLD AUTO: 4.11 X10(6)UL
SODIUM SERPL-SCNC: 141 MMOL/L (ref 136–145)
WBC # BLD AUTO: 8.4 X10(3) UL (ref 4–11)

## 2024-10-10 NOTE — PLAN OF CARE
Patient A&O x4, denies any chest pain or SOB throughout shift. Angio completed. Patient returned to unit around 1715 with TR band at 9cc of air. HOB elevated 30 degrees. Successfully able to deflate band with no adverse events. POC updated to patient and family.     Problem: Diabetes/Glucose Control  Goal: Glucose maintained within prescribed range  Description: INTERVENTIONS:  - Monitor Blood Glucose as ordered  - Assess for signs and symptoms of hyperglycemia and hypoglycemia  - Administer ordered medications to maintain glucose within target range  - Assess barriers to adequate nutritional intake and initiate nutrition consult as needed  - Instruct patient on self management of diabetes  10/9/2024 1947 by Lisa Salcedo  Outcome: Progressing  10/9/2024 1947 by Lisa Salcedo  Outcome: Progressing  10/9/2024 1513 by Lisa Salcedo  Outcome: Progressing     Problem: CARDIOVASCULAR - ADULT  Goal: Maintains optimal cardiac output and hemodynamic stability  Description: INTERVENTIONS:  - Monitor vital signs, rhythm, and trends  - Monitor for bleeding, hypotension and signs of decreased cardiac output  - Evaluate effectiveness of vasoactive medications to optimize hemodynamic stability  - Monitor arterial and/or venous puncture sites for bleeding and/or hematoma  - Assess quality of pulses, skin color and temperature  - Assess for signs of decreased coronary artery perfusion - ex. Angina  - Evaluate fluid balance, assess for edema, trend weights  Outcome: Progressing  Goal: Absence of cardiac arrhythmias or at baseline  Description: INTERVENTIONS:  - Continuous cardiac monitoring, monitor vital signs, obtain 12 lead EKG if indicated  - Evaluate effectiveness of antiarrhythmic and heart rate control medications as ordered  - Initiate emergency measures for life threatening arrhythmias  - Monitor electrolytes and administer replacement therapy as ordered  Outcome: Progressing     Problem: RESPIRATORY -  ADULT  Goal: Achieves optimal ventilation and oxygenation  Description: INTERVENTIONS:  - Assess for changes in respiratory status  - Assess for changes in mentation and behavior  - Position to facilitate oxygenation and minimize respiratory effort  - Oxygen supplementation based on oxygen saturation or ABGs  - Provide Smoking Cessation handout, if applicable  - Encourage broncho-pulmonary hygiene including cough, deep breathe, Incentive Spirometry  - Assess the need for suctioning and perform as needed  - Assess and instruct to report SOB or any respiratory difficulty  - Respiratory Therapy support as indicated  - Manage/alleviate anxiety  - Monitor for signs/symptoms of CO2 retention  Outcome: Progressing     Problem: METABOLIC/FLUID AND ELECTROLYTES - ADULT  Goal: Glucose maintained within prescribed range  Description: INTERVENTIONS:  - Monitor Blood Glucose as ordered  - Assess for signs and symptoms of hyperglycemia and hypoglycemia  - Administer ordered medications to maintain glucose within target range  - Assess barriers to adequate nutritional intake and initiate nutrition consult as needed  - Instruct patient on self management of diabetes  10/9/2024 1947 by Lisa Salcedo  Outcome: Progressing  10/9/2024 1947 by Lisa Salcedo  Outcome: Progressing  10/9/2024 1513 by Lisa Salcedo  Outcome: Progressing  Goal: Electrolytes maintained within normal limits  Description: INTERVENTIONS:  - Monitor labs and rhythm and assess patient for signs and symptoms of electrolyte imbalances  - Administer electrolyte replacement as ordered  - Monitor response to electrolyte replacements, including rhythm and repeat lab results as appropriate  - Fluid restriction as ordered  - Instruct patient on fluid and nutrition restrictions as appropriate  Outcome: Progressing  Goal: Hemodynamic stability and optimal renal function maintained  Description: INTERVENTIONS:  - Monitor labs and assess for signs and  symptoms of volume excess or deficit  - Monitor intake, output and patient weight  - Monitor urine specific gravity, serum osmolarity and serum sodium as indicated or ordered  - Monitor response to interventions for patient's volume status, including labs, urine output, blood pressure (other measures as available)  - Encourage oral intake as appropriate  - Instruct patient on fluid and nutrition restrictions as appropriate  Outcome: Progressing

## 2024-10-10 NOTE — PROGRESS NOTES
[facility]  part of formerly [facility]    Progress Note    Emily Fernandez Patient Status:  Inpatient    1932 MRN [id number]   Location [facility] 3W/SW Attending Mandy Espana MD   Hosp Day # 1 PCP Zacarias Horner MD       Subjective:   Emily Fernandez is feeling well. No chest pain or sob. Daughter is at bedside.     Objective:   Blood pressure 99/62, pulse 64, temperature 98.5 °F (36.9 °C), temperature source Oral, resp. rate 16, weight 162 lb 14.7 oz (73.9 kg), SpO2 95%.    Physical Exam:    General: No acute distress.   Respiratory: Clear to auscultation bilaterally. No wheezes. No rhonchi.  Cardiovascular: S1, S2. Regular rate and rhythm. No murmurs, rubs or gallops.   Abdomen: Soft, nontender, nondistended.  Positive bowel sounds. No rebound or guarding.  Neurologic: No focal neurological deficits.   Musculoskeletal: Moves all extremities.  Extremities: No edema.    Results:     Lab Results   Component Value Date    WBC 8.4 10/10/2024    HGB 12.7 10/10/2024    HCT 38.9 10/10/2024    .0 10/10/2024    CREATSERUM 1.08 (H) 10/10/2024    BUN 27 (H) 10/10/2024     10/10/2024    K 4.9 10/10/2024     10/10/2024    CO2 29.0 10/10/2024     (H) 10/10/2024    CA 9.8 10/10/2024    ALB 4.5 2024    ALKPHO 85 2024    BILT 0.5 2024    TP 7.4 2024    AST 25 2024    ALT 27 2024    TSH 2.053 2024       XR CHEST AP PORTABLE  (CPT=71045)    Result Date: 10/9/2024  CONCLUSION:  1. No focal airspace disease or significant pleural effusion. 2. Sequelae of remote granulomatous disease.   A preliminary report was issued by the [facility] teleradiology service. There are no major discrepancies.  elm-remote  Dictated by (CST): Joe Zurita MD on 10/09/2024 at 8:02 AM     Finalized by (CST): Joe Zurita MD on 10/09/2024 at 8:03 AM         EKG    Result Date: 10/9/2024  Normal sinus rhythm Left axis deviation Abnormal ECG Confirmed by  Jose Manuel Agrawal ([id number]) on 10/9/2024 3:34:29 PM    EKG 12 Lead    Result Date: 10/9/2024  Atrial fibrillation with rapid ventricular response Left axis deviation Nonspecific ST and T wave abnormality Abnormal ECG Confirmed by Jose Manuel Agrawal ([id number]) on 10/9/2024 3:33:13 PM      metoprolol succinate ER  50 mg Oral Daily Beta Blocker    atorvastatin  40 mg Oral Nightly    isosorbide mononitrate ER  30 mg Oral Daily    aspirin  81 mg Oral Daily    clopidogrel  75 mg Oral Daily       nitroglycerin    ondansetron      Assessment and Plan:    Chest pain/elevated troponin/NSTEMI  - status post cardiac cath- procedure not successful  - Cardiology will attempt again in 2 months  - Started on IMDUR today  - monitor overnight   -Telemetry  -No heparin since patient on Eliquis       History of PAF on Eliquis  -Telemetry  -Resume home medications when reconciled     DM  -Not on medications     HTN  CKD  CAD  History of CHF  History of CVA  -Resume home medications when reconciled     Quality:  DVT Prophylaxis: Eliquis   CODE status: Full code, presumed  MICHAEL: TBD           BROOKE CASTILLO MD  10/10/24

## 2024-10-10 NOTE — PLAN OF CARE
Cath site to right wrist soft, no hematoma. TR band removed, gauze and Tegaderm applied. Ambulates with a walker and standby assist.  Problem: CARDIOVASCULAR - ADULT  Goal: Maintains optimal cardiac output and hemodynamic stability  Description: INTERVENTIONS:  - Monitor vital signs, rhythm, and trends  - Monitor for bleeding, hypotension and signs of decreased cardiac output  - Evaluate effectiveness of vasoactive medications to optimize hemodynamic stability  - Monitor arterial and/or venous puncture sites for bleeding and/or hematoma  - Assess quality of pulses, skin color and temperature  - Assess for signs of decreased coronary artery perfusion - ex. Angina  - Evaluate fluid balance, assess for edema, trend weights  Outcome: Progressing  Goal: Absence of cardiac arrhythmias or at baseline  Description: INTERVENTIONS:  - Continuous cardiac monitoring, monitor vital signs, obtain 12 lead EKG if indicated  - Evaluate effectiveness of antiarrhythmic and heart rate control medications as ordered  - Initiate emergency measures for life threatening arrhythmias  - Monitor electrolytes and administer replacement therapy as ordered  Outcome: Progressing     Problem: RESPIRATORY - ADULT  Goal: Achieves optimal ventilation and oxygenation  Description: INTERVENTIONS:  - Assess for changes in respiratory status  - Assess for changes in mentation and behavior  - Position to facilitate oxygenation and minimize respiratory effort  - Oxygen supplementation based on oxygen saturation or ABGs  - Provide Smoking Cessation handout, if applicable  - Encourage broncho-pulmonary hygiene including cough, deep breathe, Incentive Spirometry  - Assess the need for suctioning and perform as needed  - Assess and instruct to report SOB or any respiratory difficulty  - Respiratory Therapy support as indicated  - Manage/alleviate anxiety  - Monitor for signs/symptoms of CO2 retention  Outcome: Progressing     Problem: METABOLIC/FLUID AND  ELECTROLYTES - ADULT  Goal: Hemodynamic stability and optimal renal function maintained  Description: INTERVENTIONS:  - Monitor labs and assess for signs and symptoms of volume excess or deficit  - Monitor intake, output and patient weight  - Monitor urine specific gravity, serum osmolarity and serum sodium as indicated or ordered  - Monitor response to interventions for patient's volume status, including labs, urine output, blood pressure (other measures as available)  - Encourage oral intake as appropriate  - Instruct patient on fluid and nutrition restrictions as appropriate  Outcome: Progressing

## 2024-10-10 NOTE — SPIRITUAL CARE NOTE
Spiritual Care Visit Note    Patient Name: Emily Fernandez Date of Spiritual Care Visit: 10/10/24   : 1932 Primary Dx: Chest pain, unspecified type       Referred By: Referral From:     Spiritual Care Taxonomy:    Intended Effects: Build relationship of care and support    Methods: Assist with spiritual/Quaker practices;Offer support    Interventions: Acknowledge current situation;Acknowledge response to difficult experience;Active listening;Ask guided questions;Provide hospitality;[place];Explain  role    Visit Type/Summary:     - Spiritual Care: Consulted with RN prior to visit. Offered empathic listening and emotional support. Patient and family expressed appreciation for  visit. Provided information regarding how to contact Spiritual Care and left a Spiritual Care information card.  remains available as needed for follow up.    Spiritual Care support can be requested via an Epic consult. For urgent/immediate needs, please contact the On Call  at: [place]: ext [phone]    Chaplain Resident, Martita Arias PhD

## 2024-10-10 NOTE — PAYOR COMM NOTE
--------------  ADMISSION REVIEW     Payor: [employer]  Subscriber #:  [id number]  Authorization Number: [id number]    Admit date: 10/9/24  Admit time:  6:23 AM       History   HPI  89-year-old female with history of hypertension, diabetes, and atrial fibrillation presently on Eliquis presents with complaints of chest pain.  The patient reports waxing and waning pain to her anterior chest throughout the day today.  The patient's daughter states that tonight she appeared pale and clammy.  The patient's daughter checked the patient's blood pressure and found it to be elevated.  She called the patient's primary physician who advised that she come to the ED for evaluation.  The patient also reported a headache earlier today.  She was given Tylenol by her daughter and states that headache has resolved.  The patient also states that her chest pain has resolved at present.  She reports feeling a mild pressure-like sensation to her anterior chest but denies any pain.  She denies any associated dyspnea.  She denies leg pain or swelling.  She denies cough, fever, or chills.      ED Triage Vitals [10/09/24 0330]   /87   Pulse 72   Resp 18   SpO2 94 %   O2 Device None (Room air)   Eyes: pupils equal and round no pallor or injection  ENT, Mouth: mucous membranes moist  Respiratory: there are no retractions, lungs are clear to auscultation  Cardiovascular: Irregular rhythm with normal rate  Gastrointestinal:  abdomen is soft and non tender, no masses, bowel sounds normal  Neurological: Speech normal.  Moving extremities x 4.  Skin: warm and dry, no rashes.  Musculoskeletal: neck is supple non tender        Extremities are symmetrical, full range of motion.  No leg edema or tenderness noted.  Psychiatric: patient is oriented X 3, there is no agitation    BASIC METABOLIC PANEL (8) - Abnormal; Notable for the following components:       Result Value    Glucose 151 (*)     BUN 25 (*)     Creatinine 1.06 (*)      BUN/CREA Ratio 23.6 (*)     Calculated Osmolality 305 (*)     eGFR-Cr 50 (*)     All other components within normal limits   TROPONIN I HIGH SENSITIVITY - Abnormal; Notable for the following components:    Troponin I (High Sensitivity) 99 (*)       EKG    Rate, intervals and axes as noted on EKG Report.  Rate: 102  Rhythm: Atrial Fibrillation  Axis: Left  Reading: Atrial fibrillation with tachycardic rate, no acute ischemic changes  Admission disposition: 10/9/2024  4:52 AM    Disposition and Plan     Clinical Impression:  1. Chest pain, unspecified type    2. Elevated troponin       Disposition:  Admit  10/9/2024  4:52 am       History and Physical            Emily Fernandez Patient Status:  Emergency    1932 MRN [id number]   Location [facility] EMERGENCY DEPARTMENT Attending Catracho Gutierrez MD   Hosp Day # 0 PCP Zacarias Horner MD         Chief Complaint: Chest pain     Subjective:    Emily Fernandez is a 89 year old female with history of DM, HTN, PAF, CVA, CKD, CHF, aortic stenosis, ascending aortic aneurysm, HLD who presented to the ED with complaints of chest pain.  Onset 1 day, waxing and waning through the previous day.  Overnight, she appeared pale and clammy to daughter.  She was noted with elevated  BP and was advised to come to the ED by PCP.  She also had a headache during the day that resolved with Tylenol.  No shortness of breath, lower extremity swelling.  No cough, fever or chills.    Labs significant for troponin 99--->132  ECG: NSR, LAD.  Nonspecific T wave abnormalities in lateral leads.     Cardiology consulted.  Patient to be admitted for further evaluation and treatment.  At the time of my evaluation, patient reports being chest pain-free.  She notes she chewed a couple of aspirin prior to presenting to the ED.   Labs Last 24 Hours:                     BMP         CBC       Other      Na 144 Cl 110 BUN 25 Glu 151     Hb 15.3     PTT - Procal -   K 4.2 CO2 25.0 Cr 1.06     WBC 7.8 ><  .0   INR - CRP -   Renal Lytes Endo       Hct 46.2     Trop - D dim -   eGFR - Ca 9.9 POC Gluc  -       LFT     pBNP - Lactic -   eGFR AA - PO4 - A1c -     AST - APk - Prot -   LDL -       Mg - TSH -     ALT - T esdras - Alb -               COVID-19 Lab Results     COVID-19        Lab Results   Component Value Date     COVID19 Not Detected 02/21/2023         Pro-Calcitonin  No results for input(s): \"PCT\" in the last 168 hours.     Cardiac  No results for input(s): \"TROP\", \"PBNP\" in the last 168 hours.     Creatinine Kinase  No results for input(s): \"CK\" in the last 168 hours.     Inflammatory Markers  No results for input(s): \"CRP\", \"RICARDO\", \"LDH\", \"DDIMER\" in the last 168 hours.     Imaging: Imaging data reviewed in Epic.     Assessment & Plan:    Chest pain/elevated troponin/NSTEMI  -Admit  -Serial troponin  -Telemetry  -No heparin since patient on Eliquis  -Cardiology on consult, await evaluation recommendation  -Aspirin, as needed nitro, statin, beta-blocker     History of PAF on Eliquis  -Telemetry  -Resume home medications when reconciled     DM  -Not on medications     HTN  CKD  CAD  History of CHF  History of CVA  -Resume home medications when reconciled     Quality:  DVT Prophylaxis: Hold Eliquis pending cardiology eval      CARDIOLOGY  HPI  89-year-old female presenting with one day of waxing and waning chest pain that resolved prior to presentation to the emergency department. Troponin significantly elevated from 99 to 132. Blood pressure elevated. Patient also complained of headache. No cough, fever, chills, shortness of breath, or worsening edema.     Past medical history: Hypertension, diabetes, atrial fibrillation, CVA, chronic kidney disease, aortic stenosis, ascending aortic aneurysm, hyperlipidemia  Allergies: Latex, sulfa antibiotics   Social history: Non-smoker, no alcohol use  Cardiac medications: metoprolol 50 mg daily, Eliquis 5 mg BID, Atorvastatin 40 mg daily, Amlodipine 2.5 mg daily      Physical Exam  Vitals: /74, HR 68, RR 19, SpO2 95% on room air  Constitutional: Alert and oriented, no acute distress     Psychiatric: Coherent and appropriate  Eyes: Anicteric   Neck: No JVD or carotid bruits   Lungs: Clear to auscultation bilaterally   Cardiovascular: Regular rate and rhythm, no murmurs, rubs or gallops; no lower extremity edema   Peripheral Pulses: diminished bilaterally   Abdomen: Soft, non-tender, non-distended   Musculoskeletal: No clubbing, cyanosis or edema   Neurologic: No focal deficits   Skin: Warm and dry, no rashes or lesions     Pertinent diagnostic findings: Troponin 132, Hgb 15.3, Plt 199, LDL 90; EKG with sinus rhythm, left axis deviation, nonspecific T wave changes in lateral leads     Assessment / Plan  1. Non-ST elevation myocardial infarction with lateral T wave inversion -  Currently chest pain free. Proceed with coronary angiography this afternoon with possible PCI if obstructive coronary artery disease identified. Continue aspirin, atorvastatin, and metoprolol. Hold Eliquis.     I21.4 Non-ST elevation (NSTEMI) myocardial infarction     2. Paroxysmal atrial fibrillation -  On propranolol and Eliquis with history of CVA. Hold Eliquis prior to angiography.     I48.0 Paroxysmal atrial fibrillation     3. Hypertension -   BP mildly elevated on presentation, continue home antihypertensive regimen.     I10 Essential (primary) hypertension     4. Diabetes mellitus -  Continue home diabetes medications.     E11.9 Type 2 diabetes mellitus without complications     5. Chronic kidney disease -   Monitor renal function pre and post angiography.     N18.9 Chronic kidney disease, unspecified     6. Aortic stenosis -  By listed history. Echocardiogram last year did not show it. There was mild calcification.     I35.0 Nonrheumatic aortic (valve) stenosis     7. Ascending aortic aneurysm -   Hemodynamically stable, continue surveillance imaging.     I71.2 Thoracic aortic aneurysm,  without rupture     Discussion Notes  Will proceed with coronary angiography this afternoon around 4 PM if patient agrees. Discussed risks, benefits and alternatives with patient including bleeding, infection, vascular injury, stroke, MI and death. Obtained informed consent. Continue aspirin, atorvastatin and metoprolol. Hold Eliquis prior to procedure. Awaiting TTE to evaluate cardiac function. Echo report from last year reviewed. NPO after breakfast in preparation for procedure.       MEDICATIONS ADMINISTERED IN LAST 1 DAY:  aspirin DR tab 81 mg       Date Action Dose Route User    10/10/2024 0900 Given 81 mg Oral Trinidad Cohen RN          atorvastatin (Lipitor) tab 40 mg       Date Action Dose Route User    10/9/2024 2050 Given 40 mg Oral Gill Morse RN          clopidogrel (Plavix) tab 75 mg       Date Action Dose Route User    10/10/2024 0900 Given 75 mg Oral Trinidad Cohen RN       isosorbide mononitrate ER (Imdur) 24 hr tab 30 mg       Date Action Dose Route User    10/10/2024 0900 Given 30 mg Oral Trinidad Cohen RN    10/9/2024 2050 Given 30 mg Oral Gill Morse RN            Vitals (last day)       Date/Time Temp Pulse Resp BP SpO2 Weight O2 Device O2 Flow Rate (L/min) Who    10/10/24 0857 98.5 °F (36.9 °C) 64 16 99/62 95 % -- None (Room air) -- AW    10/10/24 0612 -- 63 -- 95/71 -- -- -- -- KG    10/10/24 0533 -- -- -- -- -- 162 lb 14.7 oz (73.9 kg) -- -- ES    10/10/24 0533 -- 59 -- -- -- -- -- -- KG    10/10/24 0143 97.6 °F (36.4 °C) 63 16 97/66 95 % -- None (Room air) -- KG    10/09/24 2115 97.4 °F (36.3 °C) 74 18 141/72 97 % -- None (Room air) -- KG    10/09/24 2015 -- 60 20 122/62 98 % -- None (Room air) -- KG    10/09/24 1915 -- 64 20 111/73 97 % -- None (Room air) -- KG    10/09/24 1901 -- 58 -- -- -- -- -- -- GT    10/09/24 1851 97.6 °F (36.4 °C) 56 18 163/78 97 % -- None (Room air) -- CS    10/09/24 1815 97.5 °F (36.4 °C) 54 18 150/82 97 % -- None  (Room air) -- CS    10/09/24 1801 97.5 °F (36.4 °C) 56 18 152/98 98 % -- None (Room air) -- CS    10/09/24 1745 97.5 °F (36.4 °C) 57 18 141/84 98 % -- None (Room air) -- CS    10/09/24 1730 97.5 °F (36.4 °C) 57 18 142/74 98 % -- None (Room air) -- CS    10/09/24 1715 97.6 °F (36.4 °C) 59 16 143/89 96 % -- None (Room air) -- CS    10/09/24 1455 97.8 °F (36.6 °C) 64 18 152/89 94 % -- None (Room air) -- CS    10/09/24 0908 98.3 °F (36.8 °C) 77 18 111/77 95 % -- None (Room air) -- RP

## 2024-10-10 NOTE — PROCEDURES
[facility]    PATIENT'S NAME: KADE CHEN   ATTENDING PHYSICIAN: Mandy Espana MD   OPERATING PHYSICIAN: Roberto Glover DO   PATIENT ACCOUNT#:   [id number]    LOCATION:  [address] [place] [address]  MEDICAL RECORD #:   [id number]       YOB: 1932  ADMISSION DATE:       10/09/2024      OPERATION DATE:  10/09/2024    CARDIAC PROCEDURE TRANSCRIPTION      CARDIAC CATHETERIZATION/PERCUTANEOUS CORONARY INTERVENTION  PREOPERATIVE DIAGNOSIS:    POSTOPERATIVE DIAGNOSIS:    PROCEDURE PERFORMED:  Left heart catheterization, bilateral coronary angiogram, PTCA of the right coronary artery.    SEDATION:  Conscious sedation was performed by an independent nurse using Versed and fentanyl for 60 minutes.      INDICATION:  Non-ST myocardial infarction.      DESCRIPTION OF PROCEDURE:  Informed consent was obtained.  The patient was brought to the cardiac catheterization lab.  Right wrist was sterilely draped and prepped.  Following 1% lidocaine anesthesia, the right radial artery was cannulated.  A 5-Swedish sheath was inserted.  We used a 5-Swedish TIG catheter for right and left coronary angiography.  The same catheter was used for left ventricular end-diastolic pressure measurement, and pullback pressures were obtained.  At the end of the procedure a radial band was applied to achieve hemostasis.  During the interventional phase of the procedure, we exchanged for a 6-Swedish sheath.  We used heparin, verapamil, and nitroglycerin during the diagnostic phase of the procedure.    FINDINGS:  The left ventricular end-diastolic pressure was 14 mmHg.  Left main is normal in appearance.  Left anterior descending artery is heavily calcified with a 90% mid-vessel stenosis, followed by another 80% calcific and fibrotic stenosis, followed by another 90% distal stenosis, and the vessel tapers off quickly and is also diffusely diseased in these segments.  Circumflex coronary artery is a moderate-sized vessel.  The right coronary artery  has a heavily calcified and fibrotic mid-vessel lesion, after which the vessel tapers to a small vessel.  Apically it is extremely small and kind of diminutive.  You can see that there are left-to-right collaterals from the circumflex coronary artery.      INTERVENTIONAL PROCEDURE:  Heparin anticoagulation was used.  Two boluses of Integrilin were used.  We exchanged first before we did this to a 6-Malay guide.  We used an [place] right 1 guide catheter.  We used a Runthrough wire, and we were able to cross the occlusion of the right coronary artery fairly easily.  However, we took multiple balloons and started with a 1.5 mm balloon, downsized to a 1.25 mm balloon, and were only able to dilate the proximal portion of the lesion at up to 14 atmospheres with several inflations made.  We then exchanged for a Corsair catheter, and even that was not able to cross the lesion.      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    RECOMMENDATIONS:  The right coronary artery lesion was attempted and, because of the severe fibrosis and calcium, we were unable to fully treat this lesion.  I am also afraid that similar findings will happen in the left anterior descending artery.  Options for the patient are to do a repeat procedure with escalating wires and catheters that we have not already tried, especially going with a larger bore catheter from the groin.  However, those are also with increased risk for the patient.  This was discussed with the daughter.  There is no guarantee that there will be success, and I will also discuss with colleagues.  In the meantime, we will start her on medication management, start her on clopidogrel 75 mg daily, isosorbide 30 mg daily, and continuation of beta-blocker.    Dictated By Roberto Glover DO  d: 10/09/2024 17:42:38  t: 10/10/2024 08:06:44  Job [id number]  AS/

## 2024-10-10 NOTE — PROGRESS NOTES
Patient seen in follow up. Known to me from outpatient clinic visit. Cath note reviewed. D/w patient and daughter at bedside. No reported chest pain or sob. No new complaints. Chart reviewed.  Review of systems: Constitutional: Negative for fever.  Respiratory: Negative for shortness of breath.   Cardiac: Negative for chest pain.  Gastrointestinal: Negative for abdominal pain.    Vitals:    10/10/24 0612   BP: 95/71   Pulse: 63   Resp:    Temp:        Intake/Output Summary (Last 24 hours) at 10/10/2024 0729  Last data filed at 10/9/2024 1815  Gross per 24 hour   Intake 300 ml   Output 300 ml   Net 0 ml     Wt Readings from Last 1 Encounters:   10/10/24 162 lb 14.7 oz (73.9 kg)        General: No acute distress.  Neck: Jugular venous pulsations not seen.  Lungs: Clear to auscultation.  Heart: Normal rate. No murmurs.  Abdomen: Soft. Non tender  Extremities: No edema.  Neurological: Alert. No focal deficits.  Psychiatric: Appropriate mood and affect.  Current Facility-Administered Medications   Medication Dose Route Frequency    metoprolol succinate ER (Toprol XL) 24 hr tab 50 mg  50 mg Oral Daily Beta Blocker    atorvastatin (Lipitor) tab 40 mg  40 mg Oral Nightly    nitroglycerin (Nitrostat) SL tab 0.4 mg  0.4 mg Sublingual Q5 Min PRN    ondansetron (Zofran) 4 MG/2ML injection 4 mg  4 mg Intravenous Q6H PRN    chlorhexidine (Hibiclens) 4 % external liquid   Topical On Call    sodium chloride 0.9% infusion   Intravenous On Call    isosorbide mononitrate ER (Imdur) 24 hr tab 30 mg  30 mg Oral Daily    aspirin DR tab 81 mg  81 mg Oral Daily    clopidogrel (Plavix) tab 75 mg  75 mg Oral Daily     Medications Prior to Admission   Medication Sig    miconazole 2 % External Powder Apply 1 each topically as needed for Itching.    acetaminophen 500 MG Oral Tab Take 1 tablet (500 mg total) by mouth every 6 (six) hours as needed for Pain.    docusate sodium 100 MG Oral Cap Take 1 capsule (100 mg total) by mouth 2 (two)  times daily.    amLODIPine 2.5 MG Oral Tab Take 1 tablet (2.5 mg total) by mouth daily as needed (if SBP>150).    metoprolol succinate ER 50 MG Oral Tablet 24 Hr Take 1 tablet (50 mg total) by mouth Daily Beta Blocker.    meclizine 12.5 MG Oral Tab Take 1 tablet (12.5 mg total) by mouth 3 (three) times daily as needed for Dizziness.    apixaban 5 MG Oral Tab Take 1 tablet (5 mg total) by mouth one time.    atorvastatin 40 MG Oral Tab Take 1 tablet (40 mg total) by mouth nightly.     XR CHEST AP PORTABLE  (CPT=71045)    Result Date: 10/9/2024  CONCLUSION:  1. No focal airspace disease or significant pleural effusion. 2. Sequelae of remote granulomatous disease.   A preliminary report was issued by the [facility] teleradiology service. There are no major discrepancies.  elm-remote  Dictated by (CST): Joe Zurita MD on 10/09/2024 at 8:02 AM     Finalized by (CST): Joe Zurita MD on 10/09/2024 at 8:03 AM           Lab Results   Component Value Date    WBC 8.4 10/10/2024    HGB 12.7 10/10/2024    HCT 38.9 10/10/2024    .0 10/10/2024       IMPRESSION/Plan:   NSETMI s/p cardiac cath. Subtotal RCA. Likely chronic with L ->R collaterals. Attempt for PTCA, hard to dilated distally. Also, LAD disease, small vessel, non amenable for rotablador/pci.  Patient is currently asymptomatic. On statin/plavix/metoprolol/imdur.    Hypertension with history of orthostatic hypotension: BP on the lower side today. Will recommend dc on metoprolol and imdur only. Stop amlodipine at time of dc. Can switch to ranexa if needed  Afib.  On eliquis and bb as outpatient. Will recommend eliquis/plavix only  Dyslipidemia: Continue statin      [address]  [address]  [place] [address]  Phone: [phone]

## 2024-10-10 NOTE — PLAN OF CARE
Problem: Patient Centered Care  Goal: Patient preferences are identified and integrated in the patient's plan of care  Description: Interventions:  - What would you like us to know as we care for you? Lives home with daughter  - Provide timely, complete, and accurate information to patient/family  - Incorporate patient and family knowledge, values, beliefs, and cultural backgrounds into the planning and delivery of care  - Encourage patient/family to participate in care and decision-making at the level they choose  - Honor patient and family perspectives and choices  Outcome: Progressing     Problem: Diabetes/Glucose Control  Goal: Glucose maintained within prescribed range  Description: INTERVENTIONS:  - Monitor Blood Glucose as ordered  - Assess for signs and symptoms of hyperglycemia and hypoglycemia  - Administer ordered medications to maintain glucose within target range  - Assess barriers to adequate nutritional intake and initiate nutrition consult as needed  - Instruct patient on self management of diabetes  Outcome: Progressing     Problem: Patient/Family Goals  Goal: Patient/Family Long Term Goal  Description: Patient's Long Term Goal: get better and go home    Interventions:  - cardiology consult  -meds as ordered  - See additional Care Plan goals for specific interventions  Outcome: Progressing  Goal: Patient/Family Short Term Goal  Description: Patient's Short Term Goal: will remain without injury    Interventions:   - call light and personal belongings are within reach  -pain controlled  -bathroom assistance  -hourly rounds done  - See additional Care Plan goals for specific interventions  Outcome: Progressing     Problem: CARDIOVASCULAR - ADULT  Goal: Maintains optimal cardiac output and hemodynamic stability  Description: INTERVENTIONS:  - Monitor vital signs, rhythm, and trends  - Monitor for bleeding, hypotension and signs of decreased cardiac output  - Evaluate effectiveness of vasoactive  medications to optimize hemodynamic stability  - Monitor arterial and/or venous puncture sites for bleeding and/or hematoma  - Assess quality of pulses, skin color and temperature  - Assess for signs of decreased coronary artery perfusion - ex. Angina  - Evaluate fluid balance, assess for edema, trend weights  Outcome: Progressing  Goal: Absence of cardiac arrhythmias or at baseline  Description: INTERVENTIONS:  - Continuous cardiac monitoring, monitor vital signs, obtain 12 lead EKG if indicated  - Evaluate effectiveness of antiarrhythmic and heart rate control medications as ordered  - Initiate emergency measures for life threatening arrhythmias  - Monitor electrolytes and administer replacement therapy as ordered  Outcome: Progressing     Problem: RESPIRATORY - ADULT  Goal: Achieves optimal ventilation and oxygenation  Description: INTERVENTIONS:  - Assess for changes in respiratory status  - Assess for changes in mentation and behavior  - Position to facilitate oxygenation and minimize respiratory effort  - Oxygen supplementation based on oxygen saturation or ABGs  - Provide Smoking Cessation handout, if applicable  - Encourage broncho-pulmonary hygiene including cough, deep breathe, Incentive Spirometry  - Assess the need for suctioning and perform as needed  - Assess and instruct to report SOB or any respiratory difficulty  - Respiratory Therapy support as indicated  - Manage/alleviate anxiety  - Monitor for signs/symptoms of CO2 retention  Outcome: Progressing     Problem: METABOLIC/FLUID AND ELECTROLYTES - ADULT  Goal: Glucose maintained within prescribed range  Description: INTERVENTIONS:  - Monitor Blood Glucose as ordered  - Assess for signs and symptoms of hyperglycemia and hypoglycemia  - Administer ordered medications to maintain glucose within target range  - Assess barriers to adequate nutritional intake and initiate nutrition consult as needed  - Instruct patient on self management of  diabetes  Outcome: Progressing  Goal: Electrolytes maintained within normal limits  Description: INTERVENTIONS:  - Monitor labs and rhythm and assess patient for signs and symptoms of electrolyte imbalances  - Administer electrolyte replacement as ordered  - Monitor response to electrolyte replacements, including rhythm and repeat lab results as appropriate  - Fluid restriction as ordered  - Instruct patient on fluid and nutrition restrictions as appropriate  Outcome: Progressing  Goal: Hemodynamic stability and optimal renal function maintained  Description: INTERVENTIONS:  - Monitor labs and assess for signs and symptoms of volume excess or deficit  - Monitor intake, output and patient weight  - Monitor urine specific gravity, serum osmolarity and serum sodium as indicated or ordered  - Monitor response to interventions for patient's volume status, including labs, urine output, blood pressure (other measures as available)  - Encourage oral intake as appropriate  - Instruct patient on fluid and nutrition restrictions as appropriate  Outcome: Progressing

## 2024-10-11 VITALS
DIASTOLIC BLOOD PRESSURE: 68 MMHG | OXYGEN SATURATION: 97 % | HEART RATE: 69 BPM | RESPIRATION RATE: 18 BRPM | WEIGHT: 168.19 LBS | BODY MASS INDEX: 31 KG/M2 | TEMPERATURE: 98 F | SYSTOLIC BLOOD PRESSURE: 112 MMHG

## 2024-10-11 LAB
ANION GAP SERPL CALC-SCNC: 5 MMOL/L (ref 0–18)
BUN BLD-MCNC: 29 MG/DL (ref 9–23)
BUN/CREAT SERPL: 30.5 (ref 10–20)
CALCIUM BLD-MCNC: 10 MG/DL (ref 8.7–10.4)
CHLORIDE SERPL-SCNC: 108 MMOL/L (ref 98–112)
CO2 SERPL-SCNC: 30 MMOL/L (ref 21–32)
CREAT BLD-MCNC: 0.95 MG/DL
DEPRECATED RDW RBC AUTO: 43.2 FL (ref 35.1–46.3)
EGFRCR SERPLBLD CKD-EPI 2021: 57 ML/MIN/1.73M2 (ref 60–?)
ERYTHROCYTE [DISTWIDTH] IN BLOOD BY AUTOMATED COUNT: 12.8 % (ref 11–15)
GLUCOSE BLD-MCNC: 107 MG/DL (ref 70–99)
GLUCOSE BLDC GLUCOMTR-MCNC: 191 MG/DL (ref 70–99)
GLUCOSE BLDC GLUCOMTR-MCNC: 98 MG/DL (ref 70–99)
HCT VFR BLD AUTO: 40.4 %
HGB BLD-MCNC: 13.7 G/DL
MCH RBC QN AUTO: 31.2 PG (ref 26–34)
MCHC RBC AUTO-ENTMCNC: 33.9 G/DL (ref 31–37)
MCV RBC AUTO: 92 FL
OSMOLALITY SERPL CALC.SUM OF ELEC: 302 MOSM/KG (ref 275–295)
PLATELET # BLD AUTO: 177 10(3)UL (ref 150–450)
POTASSIUM SERPL-SCNC: 4.1 MMOL/L (ref 3.5–5.1)
RBC # BLD AUTO: 4.39 X10(6)UL
SODIUM SERPL-SCNC: 143 MMOL/L (ref 136–145)
WBC # BLD AUTO: 7.2 X10(3) UL (ref 4–11)

## 2024-10-11 PROCEDURE — 99239 HOSP IP/OBS DSCHRG MGMT >30: CPT | Performed by: HOSPITALIST

## 2024-10-11 RX ORDER — ISOSORBIDE MONONITRATE 30 MG/1
30 TABLET, EXTENDED RELEASE ORAL DAILY
Qty: 30 TABLET | Refills: 1 | Status: SHIPPED | OUTPATIENT
Start: 2024-10-12

## 2024-10-11 RX ORDER — ASPIRIN 81 MG/1
81 TABLET ORAL DAILY
Qty: 30 TABLET | Refills: 1 | Status: SHIPPED | OUTPATIENT
Start: 2024-10-12 | End: 2024-10-11

## 2024-10-11 RX ORDER — CLOPIDOGREL BISULFATE 75 MG/1
75 TABLET ORAL DAILY
Qty: 30 TABLET | Refills: 1 | Status: SHIPPED | OUTPATIENT
Start: 2024-10-12

## 2024-10-11 NOTE — DISCHARGE SUMMARY
[facility]  part of [facility]    Discharge Summary    Emily Fernandez Patient Status:  Inpatient    1932 MRN [id number]   Location [facility] 3W/SW Attending Mandy Espana MD   Hosp Day # 2 PCP Zacarias Horner MD     Date of Admission: 10/9/2024   Date of Discharge:10/11/2024    Hospital Discharge Diagnoses: Acute NSTEMI    Lace+ Score: 63  59-90 High Risk  29-58 Medium Risk  0-28   Low Risk.    TCM Follow-Up Recommendation:  LACE > 58: High Risk of readmission after discharge from the hospital.        Admitting Diagnosis: Elevated troponin [R79.89]  Chest pain, unspecified type [R07.9]    Disposition: Home    Discharge Diagnosis: .Principal Problem:    Chest pain, unspecified type  Active Problems:    Elevated troponin      Hospital Course:   Reason for Admission:   Per Dr. Morillo    Emily Fernandez is a 89 year old female with history of DM, HTN, PAF, CVA, CKD, CHF, aortic stenosis, ascending aortic aneurysm, HLD who presented to the ED with complaints of chest pain.  Onset 1 day, waxing and waning through the previous day.  Overnight, she appeared pale and clammy to daughter.  She was noted with elevated  BP and was advised to come to the ED by PCP.  She also had a headache during the day that resolved with Tylenol.  No shortness of breath, lower extremity swelling.  No cough, fever or chills.  Vital stable  Labs significant for troponin 99--->132  ECG: NSR, LAD.  Nonspecific T wave abnormalities in lateral leads.     Cardiology consulted.  Patient to be admitted for further evaluation and treatment.  At the time of my evaluation, patient reports being chest pain-free.  She notes she chewed a couple of aspirin prior to presenting to the ED.     Patient recent cardiology visit 2024 reviewed, no acute issues identified at the time.  History of prior cardiac events.       Discharge Physical Exam:   Physical Exam:    General: No acute distress.   Respiratory: Clear to  auscultation bilaterally. No wheezes. No rhonchi.  Cardiovascular: S1, S2. Regular rate and rhythm. No murmurs, rubs or gallops.   Abdomen: Soft, nontender, nondistended.  Positive bowel sounds. No rebound or guarding.  Neurologic: No focal neurological deficits.   Musculoskeletal: Moves all extremities.    Hospital Course:   Chest pain/elevated troponin/NSTEMI  - status post cardiac cath- procedure not successful  - Cardiology will attempt again in 2 months  - Started on IMDUR today  - discharge home today   -Telemetry  -No heparin since patient on Eliquis  - imdur added at discharge  - continue apixaban and plavix  - continue metoprolol at home         History of PAF on Eliquis  -Telemetry  -Resume home medications at discharge      DM  -Not on medications     HTN  CKD  CAD  History of CHF  History of CVA  -Resume home medications when reconciled     Quality:  DVT Prophylaxis: Eliquis   CODE status: Full code, presumed  MICHAEL: TBD      Complications: none    Consultants         Provider   Role Specialty     Roberto Glover DO      Consulting Physician Cardiovascular Diseases                Discharge Plan:   Discharge Condition: Stable    Current Discharge Medication List        New Orders    Details   clopidogrel 75 MG Oral Tab Take 1 tablet (75 mg total) by mouth daily.      aspirin 81 MG Oral Tab EC Take 1 tablet (81 mg total) by mouth daily.      isosorbide mononitrate ER 30 MG Oral Tablet 24 Hr Take 1 tablet (30 mg total) by mouth daily.           Home Meds - Unchanged    Details   miconazole 2 % External Powder Apply 1 each topically as needed for Itching.      acetaminophen 500 MG Oral Tab Take 1 tablet (500 mg total) by mouth every 6 (six) hours as needed for Pain.      docusate sodium 100 MG Oral Cap Take 1 capsule (100 mg total) by mouth 2 (two) times daily.      metoprolol succinate ER 50 MG Oral Tablet 24 Hr Take 1 tablet (50 mg total) by mouth Daily Beta Blocker.      meclizine 12.5 MG Oral Tab Take 1  tablet (12.5 mg total) by mouth 3 (three) times daily as needed for Dizziness.      apixaban 5 MG Oral Tab Take 1 tablet (5 mg total) by mouth one time.      atorvastatin 40 MG Oral Tab Take 1 tablet (40 mg total) by mouth nightly.                 Discharge Diet: Cardiac     Discharge Activity: As tolerated       Discharge Medications        START taking these medications        Instructions Prescription details   aspirin 81 MG Tbec  Start taking on: October 12, 2024      Take 1 tablet (81 mg total) by mouth daily.   Quantity: 30 tablet  Refills: 1     clopidogrel 75 MG Tabs  Commonly known as: Plavix  Start taking on: October 12, 2024      Take 1 tablet (75 mg total) by mouth daily.   Quantity: 30 tablet  Refills: 1     isosorbide mononitrate ER 30 MG Tb24  Commonly known as: Imdur  Start taking on: October 12, 2024      Take 1 tablet (30 mg total) by mouth daily.   Quantity: 30 tablet  Refills: 1            CONTINUE taking these medications        Instructions Prescription details   acetaminophen 500 MG Tabs  Commonly known as: Tylenol Extra Strength      Take 1 tablet (500 mg total) by mouth every 6 (six) hours as needed for Pain.   Refills: 0     apixaban 5 MG Tabs  Commonly known as: Eliquis      Take 1 tablet (5 mg total) by mouth one time.   Refills: 0     atorvastatin 40 MG Tabs  Commonly known as: Lipitor      Take 1 tablet (40 mg total) by mouth nightly.   Refills: 0     docusate sodium 100 MG Caps  Commonly known as: Colace      Take 1 capsule (100 mg total) by mouth 2 (two) times daily.   Refills: 0     meclizine 12.5 MG Tabs  Commonly known as: Antivert      Take 1 tablet (12.5 mg total) by mouth 3 (three) times daily as needed for Dizziness.   Quantity: 20 tablet  Refills: 0     metoprolol succinate ER 50 MG Tb24  Commonly known as: Toprol XL      Take 1 tablet (50 mg total) by mouth Daily Beta Blocker.   Quantity: 30 tablet  Refills: 0     miconazole 2 % Powd      Apply 1 each topically as needed for  Itching.   Quantity: 3 each  Refills: 3            STOP taking these medications      amLODIPine 2.5 MG Tabs  Commonly known as: Norvasc                  Where to Get Your Medications        These medications were sent to [facility]/pharmacy #[id number] - [place], [place] - [address] AT [facility], [phone], [phone]  [address] [facility] [address]      Hours: 24-hours Phone: [phone]   aspirin 81 MG Tbec  clopidogrel 75 MG Tabs  isosorbide mononitrate ER 30 MG Tb24         Follow up:       Follow up Labs and imaging:         Time spent:  > 30 minutes    BROOKE CASTILLO MD  10/11/2024

## 2024-10-11 NOTE — PLAN OF CARE
Patient alert and oriented x4. IV removed. Patient cleared for discharge. Educated on discharge instructions, medications, and follow up appointments. Safety plan in place, frequent rounding completed.     Problem: Patient Centered Care  Goal: Patient preferences are identified and integrated in the patient's plan of care  Description: Interventions:  - What would you like us to know as we care for you?   - Provide timely, complete, and accurate information to patient/family  - Incorporate patient and family knowledge, values, beliefs, and cultural backgrounds into the planning and delivery of care  - Encourage patient/family to participate in care and decision-making at the level they choose  - Honor patient and family perspectives and choices  Outcome: Adequate for Discharge     Problem: Diabetes/Glucose Control  Goal: Glucose maintained within prescribed range  Description: INTERVENTIONS:  - Monitor Blood Glucose as ordered  - Assess for signs and symptoms of hyperglycemia and hypoglycemia  - Administer ordered medications to maintain glucose within target range  - Assess barriers to adequate nutritional intake and initiate nutrition consult as needed  - Instruct patient on self management of diabetes  10/11/2024 1225 by Kaylyn Ace, RN  Outcome: Completed  10/11/2024 1221 by Kaylyn Ace, RN  Outcome: Progressing     Problem: Patient/Family Goals  Goal: Patient/Family Long Term Goal  Description: Patient's Long Term Goal: Discharge    Interventions:  - See additional Care Plan goals for specific interventions  Outcome: Adequate for Discharge  Goal: Patient/Family Short Term Goal  Description: Patient's Short Term Goal: Monitor cardiac condition    Interventions:   - Cardiac Medications  - See additional Care Plan goals for specific interventions  Outcome: Adequate for Discharge     Problem: CARDIOVASCULAR - ADULT  Goal: Maintains optimal cardiac output and hemodynamic stability  Description:  INTERVENTIONS:  - Monitor vital signs, rhythm, and trends  - Monitor for bleeding, hypotension and signs of decreased cardiac output  - Evaluate effectiveness of vasoactive medications to optimize hemodynamic stability  - Monitor arterial and/or venous puncture sites for bleeding and/or hematoma  - Assess quality of pulses, skin color and temperature  - Assess for signs of decreased coronary artery perfusion - ex. Angina  - Evaluate fluid balance, assess for edema, trend weights  Outcome: Adequate for Discharge  Goal: Absence of cardiac arrhythmias or at baseline  Description: INTERVENTIONS:  - Continuous cardiac monitoring, monitor vital signs, obtain 12 lead EKG if indicated  - Evaluate effectiveness of antiarrhythmic and heart rate control medications as ordered  - Initiate emergency measures for life threatening arrhythmias  - Monitor electrolytes and administer replacement therapy as ordered  Outcome: Adequate for Discharge     Problem: RESPIRATORY - ADULT  Goal: Achieves optimal ventilation and oxygenation  Description: INTERVENTIONS:  - Assess for changes in respiratory status  - Assess for changes in mentation and behavior  - Position to facilitate oxygenation and minimize respiratory effort  - Oxygen supplementation based on oxygen saturation or ABGs  - Provide Smoking Cessation handout, if applicable  - Encourage broncho-pulmonary hygiene including cough, deep breathe, Incentive Spirometry  - Assess the need for suctioning and perform as needed  - Assess and instruct to report SOB or any respiratory difficulty  - Respiratory Therapy support as indicated  - Manage/alleviate anxiety  - Monitor for signs/symptoms of CO2 retention  Outcome: Adequate for Discharge     Problem: METABOLIC/FLUID AND ELECTROLYTES - ADULT  Goal: Glucose maintained within prescribed range  Description: INTERVENTIONS:  - Monitor Blood Glucose as ordered  - Assess for signs and symptoms of hyperglycemia and hypoglycemia  - Administer  ordered medications to maintain glucose within target range  - Assess barriers to adequate nutritional intake and initiate nutrition consult as needed  - Instruct patient on self management of diabetes  10/11/2024 1225 by Kaylyn Ace, RN  Outcome: Completed  10/11/2024 1221 by Kaylyn Ace, RN  Outcome: Progressing  Goal: Electrolytes maintained within normal limits  Description: INTERVENTIONS:  - Monitor labs and rhythm and assess patient for signs and symptoms of electrolyte imbalances  - Administer electrolyte replacement as ordered  - Monitor response to electrolyte replacements, including rhythm and repeat lab results as appropriate  - Fluid restriction as ordered  - Instruct patient on fluid and nutrition restrictions as appropriate  Outcome: Adequate for Discharge  Goal: Hemodynamic stability and optimal renal function maintained  Description: INTERVENTIONS:  - Monitor labs and assess for signs and symptoms of volume excess or deficit  - Monitor intake, output and patient weight  - Monitor urine specific gravity, serum osmolarity and serum sodium as indicated or ordered  - Monitor response to interventions for patient's volume status, including labs, urine output, blood pressure (other measures as available)  - Encourage oral intake as appropriate  - Instruct patient on fluid and nutrition restrictions as appropriate  Outcome: Adequate for Discharge

## 2024-10-11 NOTE — PLAN OF CARE
Problem: Diabetes/Glucose Control  Goal: Glucose maintained within prescribed range  Description: INTERVENTIONS:  - Monitor Blood Glucose as ordered  - Assess for signs and symptoms of hyperglycemia and hypoglycemia  - Administer ordered medications to maintain glucose within target range  - Assess barriers to adequate nutritional intake and initiate nutrition consult as needed  - Instruct patient on self management of diabetes  Outcome: Progressing     Problem: CARDIOVASCULAR - ADULT  Goal: Maintains optimal cardiac output and hemodynamic stability  Description: INTERVENTIONS:  - Monitor vital signs, rhythm, and trends  - Monitor for bleeding, hypotension and signs of decreased cardiac output  - Evaluate effectiveness of vasoactive medications to optimize hemodynamic stability  - Monitor arterial and/or venous puncture sites for bleeding and/or hematoma  - Assess quality of pulses, skin color and temperature  - Assess for signs of decreased coronary artery perfusion - ex. Angina  - Evaluate fluid balance, assess for edema, trend weights  Outcome: Progressing  Goal: Absence of cardiac arrhythmias or at baseline  Description: INTERVENTIONS:  - Continuous cardiac monitoring, monitor vital signs, obtain 12 lead EKG if indicated  - Evaluate effectiveness of antiarrhythmic and heart rate control medications as ordered  - Initiate emergency measures for life threatening arrhythmias  - Monitor electrolytes and administer replacement therapy as ordered  Outcome: Progressing     Problem: RESPIRATORY - ADULT  Goal: Achieves optimal ventilation and oxygenation  Description: INTERVENTIONS:  - Assess for changes in respiratory status  - Assess for changes in mentation and behavior  - Position to facilitate oxygenation and minimize respiratory effort  - Oxygen supplementation based on oxygen saturation or ABGs  - Provide Smoking Cessation handout, if applicable  - Encourage broncho-pulmonary hygiene including cough, deep  breathe, Incentive Spirometry  - Assess the need for suctioning and perform as needed  - Assess and instruct to report SOB or any respiratory difficulty  - Respiratory Therapy support as indicated  - Manage/alleviate anxiety  - Monitor for signs/symptoms of CO2 retention  Outcome: Progressing     Problem: METABOLIC/FLUID AND ELECTROLYTES - ADULT  Goal: Glucose maintained within prescribed range  Description: INTERVENTIONS:  - Monitor Blood Glucose as ordered  - Assess for signs and symptoms of hyperglycemia and hypoglycemia  - Administer ordered medications to maintain glucose within target range  - Assess barriers to adequate nutritional intake and initiate nutrition consult as needed  - Instruct patient on self management of diabetes  Outcome: Progressing  Goal: Electrolytes maintained within normal limits  Description: INTERVENTIONS:  - Monitor labs and rhythm and assess patient for signs and symptoms of electrolyte imbalances  - Administer electrolyte replacement as ordered  - Monitor response to electrolyte replacements, including rhythm and repeat lab results as appropriate  - Fluid restriction as ordered  - Instruct patient on fluid and nutrition restrictions as appropriate  Outcome: Progressing  Goal: Hemodynamic stability and optimal renal function maintained  Description: INTERVENTIONS:  - Monitor labs and assess for signs and symptoms of volume excess or deficit  - Monitor intake, output and patient weight  - Monitor urine specific gravity, serum osmolarity and serum sodium as indicated or ordered  - Monitor response to interventions for patient's volume status, including labs, urine output, blood pressure (other measures as available)  - Encourage oral intake as appropriate  - Instruct patient on fluid and nutrition restrictions as appropriate  Outcome: Progressing   No chest pain. Vitals stable overnight. Anticipate discharge home with daughter pending medical clearance

## 2024-10-11 NOTE — PROGRESS NOTES
[facility]  part of [facility]    Cardiology Hospital Progress Note       Emily Fernandez Patient Status:  Inpatient    1932 MRN [id number]   Location [facility] 3W/SW Attending Mandy Espana MD   Hosp Day # 2 PCP Zacarias Horner MD       Patient is a 89 year old female who was admitted to the hospital for Chest pain, unspecified type:  Subjective:  Patient sitting up in NAD, in good spirit  Daughter by the bedside  HR and BP stable  C Monitor: Sinus Rhythm  No CP/SOB at rest/Palpitations    Past Medical History:   Past Medical History:    Arrhythmia    Diabetes (HCC)    Essential hypertension    Hearing impairment    Hypokalemia    Visual impairment       Past Surgical History:  Past Surgical History:   Procedure Laterality Date    Hip replacement surgery      Removal gallbladder         Family History:  Family History   Problem Relation Age of Onset    No Known Problems Father     No Known Problems Mother     Cancer Daughter        Social History:  Pediatric History   Patient Parents    Not on file     Other Topics Concern    Grew up on a farm No    History of tanning Yes    Outdoor occupation No    Breast feeding No    Reaction to local anesthetic No    Pt has a pacemaker No    Pt has a defibrillator No   Social History Narrative    Not on file           Current Medications:  Current Facility-Administered Medications   Medication Dose Route Frequency    apixaban (Eliquis) tab 5 mg  5 mg Oral BID    metoprolol succinate ER (Toprol XL) 24 hr tab 50 mg  50 mg Oral Daily Beta Blocker    atorvastatin (Lipitor) tab 40 mg  40 mg Oral Nightly    nitroglycerin (Nitrostat) SL tab 0.4 mg  0.4 mg Sublingual Q5 Min PRN    ondansetron (Zofran) 4 MG/2ML injection 4 mg  4 mg Intravenous Q6H PRN    isosorbide mononitrate ER (Imdur) 24 hr tab 30 mg  30 mg Oral Daily    aspirin DR tab 81 mg  81 mg Oral Daily    clopidogrel (Plavix) tab 75 mg  75 mg Oral Daily     Medications Prior to Admission    Medication Sig    miconazole 2 % External Powder Apply 1 each topically as needed for Itching.    acetaminophen 500 MG Oral Tab Take 1 tablet (500 mg total) by mouth every 6 (six) hours as needed for Pain.    docusate sodium 100 MG Oral Cap Take 1 capsule (100 mg total) by mouth 2 (two) times daily.    amLODIPine 2.5 MG Oral Tab Take 1 tablet (2.5 mg total) by mouth daily as needed (if SBP>150).    metoprolol succinate ER 50 MG Oral Tablet 24 Hr Take 1 tablet (50 mg total) by mouth Daily Beta Blocker.    meclizine 12.5 MG Oral Tab Take 1 tablet (12.5 mg total) by mouth 3 (three) times daily as needed for Dizziness.    apixaban 5 MG Oral Tab Take 1 tablet (5 mg total) by mouth one time.    atorvastatin 40 MG Oral Tab Take 1 tablet (40 mg total) by mouth nightly.       Allergies:  Allergies[1]    Review of Systems:   A comprehensive 12 point review of system was performed.  All pertinent positive and negative findings per HPI.    Physical Exam:   Blood pressure 112/68, pulse 69, temperature 98.3 °F (36.8 °C), temperature source Oral, resp. rate 18, weight 168 lb 3.2 oz (76.3 kg), SpO2 97%.  Intake/Output:   Last 3 shifts: [id number]@   This shift: I/O this shift:  In: 480 [P.O.:480]  Out: 800 [Urine:800]     Vent Settings:      Hemodynamic parameters (last 24 hours):      Scheduled Meds:    apixaban  5 mg Oral BID    metoprolol succinate ER  50 mg Oral Daily Beta Blocker    atorvastatin  40 mg Oral Nightly    isosorbide mononitrate ER  30 mg Oral Daily    aspirin  81 mg Oral Daily    clopidogrel  75 mg Oral Daily       Continuous Infusions:       Physical Exam:    General: No acute distress.  HEENT: NAD  Neck: No JVD, no bruits.  Cardiac: Normal rate, No murmur./RRR  Lungs: Clear without rhales, rhochi or wheezing.  Abdomen: Soft, non-tender. No abdominal bruit. No hepatojugular reflux.  Extremities: No edema.    Neurologic: Alert and moving all 4 extremities.  Psychiatry: Patient has calm affect.      Results:      Laboratory Data:  Lab Results   Component Value Date    WBC 7.2 10/11/2024    HGB 13.7 10/11/2024    HCT 40.4 10/11/2024    .0 10/11/2024    CREATSERUM 0.95 10/11/2024    BUN 29 (H) 10/11/2024     10/11/2024    K 4.1 10/11/2024     10/11/2024    CO2 30.0 10/11/2024     (H) 10/11/2024    CA 10.0 10/11/2024    ALB 4.5 03/06/2024    ALKPHO 85 03/06/2024    TP 7.4 03/06/2024    AST 25 03/06/2024    ALT 27 03/06/2024    TSH 2.053 03/06/2024         Imaging:  CXR:  CONCLUSION:  1. No focal airspace disease or significant pleural effusion. 2. Sequelae of remote granulomatous disease      C Cath: by Dr Glover  Preop diagnosis: NSTEMI  Post op diagnosis: CAD  Procedures: [facility] COR ANGIO PTCA OF RCA  Findings: 90-95% chronic appearing rca lesion with collaterals from circumflex. I was able to cross this lesion but even the smallest balloon was able to dilate upto the proximal portion of the lesion but unable to cross fully. LAD has heavily calcified mid lesion and then 2 other tandem calcific and fibrotic LAD lesion. Vessel is too small for rotablator and I doubt we will get any balloons to cross. Will discuss with colleagues to see if repeat attempt at RCA with larger sheath and guides would help. LCx is without obstruction.  EBL: 20 mls  Specimens: None     IMPRESSION:    NSETMI s/p cardiac cath. Subtotal RCA. Likely chronic with L ->R collaterals. Attempt for PTCA, hard to dilated distally. Also, LAD disease, small vessel, non amenable for rotablador/pci.  Patient is currently asymptomatic. On statin/plavix/metoprolol/imdur.    Hypertension with history of orthostatic hypotension: BP on the lower side today. Will recommend dc on metoprolol and imdur only. Stop amlodipine at time of dc. Can switch to ranexa if needed  Afib.  On eliquis and bb as outpatient. Will recommend eliquis/plavix only  Dyslipidemia: Continue statin      RECOMMENDATIONS:    D/W patient and family by the bedside  Maximize  medical Rx  Patient does not have angina now  Remains in NSR with stable BP  Antigravity movement instructions given  Stop ASA  W/f bleeding and follow H&H         Cardiac meds:  Eliquis 5 mg bid  Plavix 75 mg  Atorvastatin 40 mg  Metoprolol XL 50 mg  Imdur 30 mg    Stable from cardiac standpoint    D/W patient and daughter  D/w Nursing staff        Please do not hesitate to call with questions.    Anthony Edge MD  [facility]  [address]  [place] [address]  [phone]    This note was prepared using Dragon Medical voice recognition dictation software. As a result errors may occur. When identified these errors have been corrected. While every attempt is made to correct errors during dictation discrepancies may still exist        [1]   Allergies  Allergen Reactions    Latex ITCHING    Sulfa Antibiotics ITCHING

## 2024-10-14 ENCOUNTER — PATIENT OUTREACH (OUTPATIENT)
Dept: CASE MANAGEMENT | Age: 89
End: 2024-10-14

## 2024-10-14 NOTE — CDS QUERY
Dear Dr Espana      Can the type/acuity of congestive heart failure be further specified?      ( X) Chronic Diastolic heart failure    ( ) Other CHF(specify)____________________________    _______________________________________________________________________________________    Risk factors:advanced age, ho chf    Clinical indicators:10/9/24 echo-EF 55-65%, grade 1 diastolic dysfunction    Treatment: cardiology consult, home meds: metoprolol continued      If you have any questions, please contact Clinical :  Pauline ANGUIANO RN at [phone]     Thank You!    THIS FORM IS A PERMANENT PART OF THE MEDICAL RECORD

## 2024-10-14 NOTE — PROGRESS NOTES
NCM attempted to reach the patient to complete a [facility] follow up call. Left message to call back. [facility] provided direct contact info at [phone].

## 2024-10-16 NOTE — PROGRESS NOTES
TCM request (discharged 10/11)    Dr Zacarias Horner  Internal Medicine  [address] [place] [id number]  [phone]    Attempt #1:  Left message on voicemail for patient to call transitions specialist back to schedule follow up appointments. Provided Transitions specialist scheduling phone number [phone].

## 2024-10-16 NOTE — PAYOR COMM NOTE
--------------  CONTINUED STAY REVIEW  10/9-10/11  Payor: [employer]  Subscriber #:  [id number]  Authorization Number: [id number]    Admit date: 10/9/24  Admit time:  6:23 AM    REVIEW DOCUMENTATION:    10/9    Preop diagnosis: NSTEMI  Post op diagnosis: CAD  Procedures: [facility] COR ANGIO PTCA OF RCA  Findings: 90-95% chronic appearing rca lesion with collaterals from circumflex. I was able to cross this lesion but even the smallest balloon was able to dilate upto the proximal portion of the lesion but unable to cross fully. LAD has heavily calcified mid lesion and then 2 other tandem calcific and fibrotic LAD lesion. Vessel is too small for rotablator and I doubt we will get any balloons to cross. Will discuss with colleagues to see if repeat attempt at RCA with larger sheath and guides would help. LCx is without obstruction.  EBL: 20 mls  Specimens: None            10/10 Cardiology    Patient seen in follow up. Known to me from outpatient clinic visit. Cath note reviewed. D/w patient and daughter at bedside. No reported chest pain or sob. No new complaints. Chart reviewed.  Review of systems: Constitutional: Negative for fever.  Respiratory: Negative for shortness of breath.   Cardiac: Negative for chest pain.  Gastrointestinal: Negative for abdominal pain.       IMPRESSION/Plan:   NSETMI s/p cardiac cath. Subtotal RCA. Likely chronic with L ->R collaterals. Attempt for PTCA, hard to dilated distally. Also, LAD disease, small vessel, non amenable for rotablador/pci.  Patient is currently asymptomatic. On statin/plavix/metoprolol/imdur.    Hypertension with history of orthostatic hypotension: BP on the lower side today. Will recommend dc on metoprolol and imdur only. Stop amlodipine at time of dc. Can switch to ranexa if needed  Afib.  On eliquis and bb as outpatient. Will recommend eliquis/plavix only  Dyslipidemia: Continue statin          10/10 IM    Objective:   Blood pressure 99/62, pulse 64,  temperature 98.5 °F (36.9 °C), temperature source Oral, resp. rate 16, weight 162 lb 14.7 oz (73.9 kg), SpO2 95%.     Physical Exam:    General: No acute distress.   Respiratory: Clear to auscultation bilaterally. No wheezes. No rhonchi.  Cardiovascular: S1, S2. Regular rate and rhythm. No murmurs, rubs or gallops.   Abdomen: Soft, nontender, nondistended.  Positive bowel sounds. No rebound or guarding.  Neurologic: No focal neurological deficits.   Musculoskeletal: Moves all extremities.  Extremities: No edema.     Results:            Lab Results   Component Value Date     WBC 8.4 10/10/2024     HGB 12.7 10/10/2024     HCT 38.9 10/10/2024     .0 10/10/2024     CREATSERUM 1.08 (H) 10/10/2024     BUN 27 (H) 10/10/2024      10/10/2024     K 4.9 10/10/2024      10/10/2024     CO2 29.0 10/10/2024      (H) 10/10/2024     CA 9.8 10/10/2024       Assessment and Plan:    Chest pain/elevated troponin/NSTEMI  - status post cardiac cath- procedure not successful  - Cardiology will attempt again in 2 months  - Started on IMDUR today  - monitor overnight   -Telemetry  -No heparin since patient on Eliquis        History of PAF on Eliquis  -Telemetry  -Resume home medications when reconciled     DM  -Not on medications     HTN  CKD  CAD  History of CHF  History of CVA  -Resume home medications when reconciled           Vitals (last day) before discharge       Date/Time Temp Pulse Resp BP SpO2 Weight O2 Device O2 Flow Rate (L/min) [facility]    10/11/24 1040 98.3 °F (36.8 °C) 69 18 112/68 97 % -- None (Room air) -- GD    10/11/24 0621 -- 62 -- 144/85 -- -- -- -- KG    10/11/24 0618 -- 64 -- 160/77 -- -- -- -- KG    10/11/24 0242 -- -- -- -- -- 168 lb 3.2 oz (76.3 kg) -- -- FS    10/11/24 0235 98 °F (36.7 °C) 83 20 154/74 95 % -- None (Room air) -- KG    10/10/24 2025 99 °F (37.2 °C) 68 24 116/69 96 % -- None (Room air) -- KG    10/10/24 1600 98.2 °F (36.8 °C) 65 18 100/60 94 % -- None (Room air) -- AW    10/10/24  0857 98.5 °F (36.9 °C) 64 16 99/62 95 % -- None (Room air) -- AW    10/10/24 0612 -- 63 -- 95/71 -- -- -- -- KG    10/10/24 0533 -- -- -- -- -- 162 lb 14.7 oz (73.9 kg) -- -- ES    10/10/24 0533 -- 59 -- -- -- -- -- -- KG    10/10/24 0143 97.6 °F (36.4 °C) 63 16 97/66 95 % -- None (Room air) -- KG         --------------  DISCHARGE REVIEW    Payor: [employer]  Subscriber #:  [id number]  Authorization Number: [id number]    Admit date: 10/9/24  Admit time:   6:23 AM  Discharge Date: 10/11/2024  2:31 PM     Admitting Physician: Tatiana Morillo MD  Attending Physician:  No att. providers found  Primary Care Physician: Ivy Horner MD          Discharge Summary Notes        Discharge Summary signed by Mandy Espana MD at 10/11/2024  1:12 PM       Author: Mandy Espana MD Specialty: HOSPITALIST Author Type: Physician    Filed: 10/11/2024  1:12 PM Date of Service: 10/11/2024 12:29 PM Status: Signed    : Mandy Espana MD (Physician)         [facility]  part of [facility]    Discharge Summary    Emily Fernandez Patient Status:  Inpatient    1932 MRN [id number]   Location [facility] 3W/ Attending Mandy Espana MD   Hosp Day # 2 PCP Zacarias Horner MD     Date of Admission: 10/9/2024   Date of Discharge:10/11/2024    Hospital Discharge Diagnoses: Acute NSTEMI    Lace+ Score: 63  59-90 High Risk  29-58 Medium Risk  0-28   Low Risk.    TCM Follow-Up Recommendation:  LACE > 58: High Risk of readmission after discharge from the hospital.        Admitting Diagnosis: Elevated troponin [R79.89]  Chest pain, unspecified type [R07.9]    Disposition: Home    Discharge Diagnosis: .Principal Problem:    Chest pain, unspecified type  Active Problems:    Elevated troponin      Hospital Course:   Reason for Admission:   Per Dr. Yisel Fernandez is a 89 year old female with history of DM, HTN, PAF, CVA, CKD, CHF, aortic stenosis, ascending aortic aneurysm, HLD who  presented to the ED with complaints of chest pain.  Onset 1 day, waxing and waning through the previous day.  Overnight, she appeared pale and clammy to daughter.  She was noted with elevated  BP and was advised to come to the ED by PCP.  She also had a headache during the day that resolved with Tylenol.  No shortness of breath, lower extremity swelling.  No cough, fever or chills.  Vital stable  Labs significant for troponin 99--->132  ECG: NSR, LAD.  Nonspecific T wave abnormalities in lateral leads.     Cardiology consulted.  Patient to be admitted for further evaluation and treatment.  At the time of my evaluation, patient reports being chest pain-free.  She notes she chewed a couple of aspirin prior to presenting to the ED.     Patient recent cardiology visit 9/13/2024 reviewed, no acute issues identified at the time.  History of prior cardiac events.       Discharge Physical Exam:   Physical Exam:    General: No acute distress.   Respiratory: Clear to auscultation bilaterally. No wheezes. No rhonchi.  Cardiovascular: S1, S2. Regular rate and rhythm. No murmurs, rubs or gallops.   Abdomen: Soft, nontender, nondistended.  Positive bowel sounds. No rebound or guarding.  Neurologic: No focal neurological deficits.   Musculoskeletal: Moves all extremities.    Hospital Course:   Chest pain/elevated troponin/NSTEMI  - status post cardiac cath- procedure not successful  - Cardiology will attempt again in 2 months  - Started on IMDUR today  - discharge home today   -Telemetry  -No heparin since patient on Eliquis  - imdur added at discharge  - continue apixaban and plavix  - continue metoprolol at home         History of PAF on Eliquis  -Telemetry  -Resume home medications at discharge      DM  -Not on medications     HTN  CKD  CAD  History of CHF  History of CVA  -Resume home medications when reconciled     Quality:  DVT Prophylaxis: Eliquis   CODE status: Full code, presumed  MICHAEL: TBD      Complications:  none    Consultants         Provider   Role Specialty     Roberto Glover DO      Consulting Physician Cardiovascular Diseases                Discharge Plan:   Discharge Condition: Stable    Current Discharge Medication List        New Orders    Details   clopidogrel 75 MG Oral Tab Take 1 tablet (75 mg total) by mouth daily.      aspirin 81 MG Oral Tab EC Take 1 tablet (81 mg total) by mouth daily.      isosorbide mononitrate ER 30 MG Oral Tablet 24 Hr Take 1 tablet (30 mg total) by mouth daily.           Home Meds - Unchanged    Details   miconazole 2 % External Powder Apply 1 each topically as needed for Itching.      acetaminophen 500 MG Oral Tab Take 1 tablet (500 mg total) by mouth every 6 (six) hours as needed for Pain.      docusate sodium 100 MG Oral Cap Take 1 capsule (100 mg total) by mouth 2 (two) times daily.      metoprolol succinate ER 50 MG Oral Tablet 24 Hr Take 1 tablet (50 mg total) by mouth Daily Beta Blocker.      meclizine 12.5 MG Oral Tab Take 1 tablet (12.5 mg total) by mouth 3 (three) times daily as needed for Dizziness.      apixaban 5 MG Oral Tab Take 1 tablet (5 mg total) by mouth one time.      atorvastatin 40 MG Oral Tab Take 1 tablet (40 mg total) by mouth nightly.                 Discharge Diet: Cardiac     Discharge Activity: As tolerated       Discharge Medications        START taking these medications        Instructions Prescription details   aspirin 81 MG Tbec  Start taking on: October 12, 2024      Take 1 tablet (81 mg total) by mouth daily.   Quantity: 30 tablet  Refills: 1     clopidogrel 75 MG Tabs  Commonly known as: Plavix  Start taking on: October 12, 2024      Take 1 tablet (75 mg total) by mouth daily.   Quantity: 30 tablet  Refills: 1     isosorbide mononitrate ER 30 MG Tb24  Commonly known as: Imdur  Start taking on: October 12, 2024      Take 1 tablet (30 mg total) by mouth daily.   Quantity: 30 tablet  Refills: 1            CONTINUE taking these medications         Instructions Prescription details   acetaminophen 500 MG Tabs  Commonly known as: Tylenol Extra Strength      Take 1 tablet (500 mg total) by mouth every 6 (six) hours as needed for Pain.   Refills: 0     apixaban 5 MG Tabs  Commonly known as: Eliquis      Take 1 tablet (5 mg total) by mouth one time.   Refills: 0     atorvastatin 40 MG Tabs  Commonly known as: Lipitor      Take 1 tablet (40 mg total) by mouth nightly.   Refills: 0     docusate sodium 100 MG Caps  Commonly known as: Colace      Take 1 capsule (100 mg total) by mouth 2 (two) times daily.   Refills: 0     meclizine 12.5 MG Tabs  Commonly known as: Antivert      Take 1 tablet (12.5 mg total) by mouth 3 (three) times daily as needed for Dizziness.   Quantity: 20 tablet  Refills: 0     metoprolol succinate ER 50 MG Tb24  Commonly known as: Toprol XL      Take 1 tablet (50 mg total) by mouth Daily Beta Blocker.   Quantity: 30 tablet  Refills: 0     miconazole 2 % Powd      Apply 1 each topically as needed for Itching.   Quantity: 3 each  Refills: 3            STOP taking these medications      amLODIPine 2.5 MG Tabs  Commonly known as: Norvasc                  Where to Get Your Medications        These medications were sent to [facility]/pharmacy #[id number] - [place], [place] - [address] AT [facility], [phone], [phone]  [address] [facility], [facility] [address]      Hours: 24-hours Phone: [phone]   aspirin 81 MG Tbec  clopidogrel 75 MG Tabs  isosorbide mononitrate ER 30 MG Tb24         Follow up:       Follow up Labs and imaging:         Time spent:  > 30 minutes    MANDY CASTILLO MD  10/11/2024      Electronically signed by Mandy Castillo MD on 10/11/2024  1:12 PM         REVIEWER COMMENTS

## 2024-10-16 NOTE — PROGRESS NOTES
TCM has attempted to contact patient with no response however, patient needs additional appointments.  Please contact patient for assistance with scheduling a follow-up appointment for  PCP .  Should you reach patient please notify TCM RN so they can speak with patient.  Thank you!

## 2024-10-17 NOTE — PROGRESS NOTES
TCM request (discharged 10/11)     Dr Zacarias Horner  Internal Medicine  [address] [place] [address]  [phone]     Attempt #2:  Left message on voicemail for patient to call transitions specialist back to schedule follow up appointments. Provided Transitions specialist scheduling phone number [phone].

## 2024-10-18 NOTE — PROGRESS NOTES
TCM request (discharged 10/11)     Dr Zacarias Horner  Internal Medicine  [address] [place] [address]  [phone]  Multiple attempts w/no calls back; no appointment made  Closing encounter    Attempt #3:  Left message on voicemail for patient to call transitions specialist back to schedule follow up appointments. Provided Transitions specialist scheduling phone number [phone]. Closing encounter. Will re-open if patient returns call.

## 2024-10-21 ENCOUNTER — TELEPHONE (OUTPATIENT)
Dept: INTERNAL MEDICINE CLINIC | Facility: CLINIC | Age: 89
End: 2024-10-21

## 2024-10-21 NOTE — TELEPHONE ENCOUNTER
Patient's daughter is requesting a hospital follow up appointment with Dr. Iyv Horner week of 10/21/24 about 10 AM at the [facility] site.    Patient was discharged on 10/11/24.    Please call patient's daughter back to assist in scheduling the appointment

## 2024-10-23 ENCOUNTER — TELEPHONE (OUTPATIENT)
Dept: INTERNAL MEDICINE CLINIC | Facility: CLINIC | Age: 89
End: 2024-10-23

## 2024-10-23 ENCOUNTER — OFFICE VISIT (OUTPATIENT)
Dept: INTERNAL MEDICINE CLINIC | Facility: CLINIC | Age: 89
End: 2024-10-23

## 2024-10-23 ENCOUNTER — MED REC SCAN ONLY (OUTPATIENT)
Dept: INTERNAL MEDICINE CLINIC | Facility: CLINIC | Age: 89
End: 2024-10-23

## 2024-10-23 VITALS
SYSTOLIC BLOOD PRESSURE: 116 MMHG | TEMPERATURE: 97 F | BODY MASS INDEX: 29.44 KG/M2 | WEIGHT: 160 LBS | OXYGEN SATURATION: 97 % | HEART RATE: 61 BPM | HEIGHT: 62 IN | RESPIRATION RATE: 18 BRPM | DIASTOLIC BLOOD PRESSURE: 50 MMHG

## 2024-10-23 DIAGNOSIS — E11.00 TYPE 2 DIABETES MELLITUS WITH HYPEROSMOLARITY WITHOUT COMA, WITHOUT LONG-TERM CURRENT USE OF INSULIN (HCC): ICD-10-CM

## 2024-10-23 DIAGNOSIS — I21.4 NSTEMI (NON-ST ELEVATED MYOCARDIAL INFARCTION) (HCC): Primary | ICD-10-CM

## 2024-10-23 LAB
EST. AVERAGE GLUCOSE BLD GHB EST-MCNC: 117 MG/DL (ref 68–126)
HBA1C MFR BLD: 5.7 % (ref ?–5.7)

## 2024-10-23 PROCEDURE — 1159F MED LIST DOCD IN RCRD: CPT | Performed by: INTERNAL MEDICINE

## 2024-10-23 PROCEDURE — 3074F SYST BP LT 130 MM HG: CPT | Performed by: INTERNAL MEDICINE

## 2024-10-23 PROCEDURE — 1111F DSCHRG MED/CURRENT MED MERGE: CPT | Performed by: INTERNAL MEDICINE

## 2024-10-23 PROCEDURE — 1126F AMNT PAIN NOTED NONE PRSNT: CPT | Performed by: INTERNAL MEDICINE

## 2024-10-23 PROCEDURE — 3078F DIAST BP <80 MM HG: CPT | Performed by: INTERNAL MEDICINE

## 2024-10-23 PROCEDURE — 36415 COLL VENOUS BLD VENIPUNCTURE: CPT | Performed by: INTERNAL MEDICINE

## 2024-10-23 PROCEDURE — 99214 OFFICE O/P EST MOD 30 MIN: CPT | Performed by: INTERNAL MEDICINE

## 2024-10-23 PROCEDURE — 3008F BODY MASS INDEX DOCD: CPT | Performed by: INTERNAL MEDICINE

## 2024-10-23 NOTE — TELEPHONE ENCOUNTER
Simeon from [facility] called following-up on outstanding home health orders from July for this patient. She stated they got the orders faxed back but they were NOT signed. She is requesting that the orders are signed and dated and faxed back to the following fax#:    Fax#: [phone]    She stated she sent the orders again at 3:20pm and its 15 pages and there are 4 orders in the fax she sent and they all need to be signed. She said its two physical therapy care plans and evaluations.    PLEASE SEE telephone encounter FROM 07/25/2024.

## 2024-10-23 NOTE — PROGRESS NOTES
Subjective:     Patient ID: Emily Fernandez is a 89 year old female.    HPI    History/Other:    She is here today for follow up after she was discharged form the hospital    She was admitted due to chest pain diagnosed with nstemi. Cath was  not successful  they decided on medical management    Currently she denies any complains  Review of Systems   Constitutional: Negative.    HENT: Negative.     Respiratory: Negative.     Cardiovascular: Negative.    Gastrointestinal: Negative.    Genitourinary: Negative.    Musculoskeletal: Negative.    Skin: Negative.    Neurological: Negative.    Hematological: Negative.    Psychiatric/Behavioral: Negative.       Current Outpatient Medications   Medication Sig Dispense Refill    clopidogrel 75 MG Oral Tab Take 1 tablet (75 mg total) by mouth daily. 30 tablet 1    isosorbide mononitrate ER 30 MG Oral Tablet 24 Hr Take 1 tablet (30 mg total) by mouth daily. 30 tablet 1    miconazole 2 % External Powder Apply 1 each topically as needed for Itching. 3 each 3    acetaminophen 500 MG Oral Tab Take 1 tablet (500 mg total) by mouth every 6 (six) hours as needed for Pain.      docusate sodium 100 MG Oral Cap Take 1 capsule (100 mg total) by mouth 2 (two) times daily.      metoprolol succinate ER 50 MG Oral Tablet 24 Hr Take 1 tablet (50 mg total) by mouth Daily Beta Blocker. 30 tablet 0    meclizine 12.5 MG Oral Tab Take 1 tablet (12.5 mg total) by mouth 3 (three) times daily as needed for Dizziness. 20 tablet 0    apixaban 5 MG Oral Tab Take 1 tablet (5 mg total) by mouth 2 (two) times daily.      atorvastatin 40 MG Oral Tab Take 1 tablet (40 mg total) by mouth nightly.       Allergies:Allergies[1]    Past Medical History:    Arrhythmia    Diabetes (HCC)    Essential hypertension    Hearing impairment    Hypokalemia    Visual impairment      Past Surgical History:   Procedure Laterality Date    Hip replacement surgery      Removal gallbladder        Family History   Problem Relation Age  of Onset    No Known Problems Father     No Known Problems Mother     Cancer Daughter       Social History:   Social History     Socioeconomic History    Marital status:    Tobacco Use    Smoking status: Never    Smokeless tobacco: Never   Vaping Use    Vaping status: Never Used   Substance and Sexual Activity    Alcohol use: Never    Drug use: Never   Other Topics Concern    Grew up on a farm No    History of tanning Yes    Outdoor occupation No    Breast feeding No    Reaction to local anesthetic No    Pt has a pacemaker No    Pt has a defibrillator No     Social Drivers of Health     Food Insecurity: No Food Insecurity (10/9/2024)    Food Insecurity     Food Insecurity: Never true   Transportation Needs: No Transportation Needs (10/9/2024)    Transportation Needs     Lack of Transportation: No   Housing Stability: Low Risk  (10/9/2024)    Housing Stability     Housing Instability: No        Objective:   Physical Exam  Vitals and nursing note reviewed.   Constitutional:       Appearance: Normal appearance.   HENT:      Head: Normocephalic and atraumatic.   Cardiovascular:      Rate and Rhythm: Normal rate and regular rhythm.      Pulses: Normal pulses.      Heart sounds: Normal heart sounds.   Pulmonary:      Effort: Pulmonary effort is normal.      Breath sounds: Normal breath sounds.   Musculoskeletal:      Cervical back: Normal range of motion and neck supple.   Skin:     General: Skin is warm.   Neurological:      Mental Status: She is alert. Mental status is at baseline.   Psychiatric:         Mood and Affect: Mood normal.         Assessment & Plan:   1. Type 2 diabetes mellitus with hyperosmolarity without coma, without long-term current use of insulin (HCC)    Check hba1c     2. NSTEMI notes reviewed , continue with current medications     Orders Placed This Encounter   Procedures    Hemoglobin A1C [E]       Meds This Visit:  Requested Prescriptions      No prescriptions requested or ordered in this  encounter       Imaging & Referrals:  None            [1]   Allergies  Allergen Reactions    Latex ITCHING    Sulfa Antibiotics ITCHING

## 2024-11-04 ENCOUNTER — NURSE TRIAGE (OUTPATIENT)
Dept: INTERNAL MEDICINE CLINIC | Facility: CLINIC | Age: 89
End: 2024-11-04

## 2024-11-04 NOTE — TELEPHONE ENCOUNTER
Action Requested: Summary for Provider     []  Critical Lab, Recommendations Needed  [] Need Additional Advice  [x]   FYI    []   Need Orders  [] Need Medications Sent to Pharmacy  []  Other     SUMMARY: Visit tomorrow Dr Zacarias Horner     Reason for call: Acute  Onset: Today    Daughter of Patient called office. Date of birth and full name both confirmed.   She is with patient currently.     Asking for medication for cough       Triaged and advised care advice   Denies shortness of breath, difficulty breathing, chest pain, chest pressure.   Denies fever.   Denies Gastroenterology symptoms  Evaluation advised , offered appointment today, but they cannot make appointment today.   Asking for appointment tomorrow afternoon.     Encouraged hydration, humidifier, warm herbal tea     Advised to monitor symptoms.  If needs medications today, should be seen today.   RN advised if symptoms get severely worse, patient should seek care at Emergency Room or Immediate Care.  RN also informed patient to seek immediate medical attention at ER if patient experiences severe/worsening symptoms, shortness of breath, chest pain, or severe pain.  Patient verbalizes understanding and is agreeable to instructions.         Future Appointments   Date Time Provider Department Center   11/5/2024  1:45 PM Zacarias Horner MD [id number]  [place] [facility]       Reason for Disposition   Continuous (nonstop) coughing interferes with work or school and no improvement using cough treatment per Care Advice    Protocols used: Cough-A-OH

## 2024-11-05 ENCOUNTER — OFFICE VISIT (OUTPATIENT)
Dept: INTERNAL MEDICINE CLINIC | Facility: CLINIC | Age: 89
End: 2024-11-05

## 2024-11-05 DIAGNOSIS — R09.89 CHEST CONGESTION: ICD-10-CM

## 2024-11-05 DIAGNOSIS — R05.1 ACUTE COUGH: Primary | ICD-10-CM

## 2024-11-05 PROBLEM — N18.30 CKD (CHRONIC KIDNEY DISEASE) STAGE 3, GFR 30-59 ML/MIN (HCC): Chronic | Status: ACTIVE | Noted: 2024-11-05

## 2024-11-05 LAB
COVID19 BINAX NOW ANTIGEN: NOT DETECTED
OPERATOR ID: NORMAL
POCT LOT NUMBER: NORMAL

## 2024-11-05 PROCEDURE — 3078F DIAST BP <80 MM HG: CPT | Performed by: INTERNAL MEDICINE

## 2024-11-05 PROCEDURE — 3074F SYST BP LT 130 MM HG: CPT | Performed by: INTERNAL MEDICINE

## 2024-11-05 PROCEDURE — 1160F RVW MEDS BY RX/DR IN RCRD: CPT | Performed by: INTERNAL MEDICINE

## 2024-11-05 PROCEDURE — 1159F MED LIST DOCD IN RCRD: CPT | Performed by: INTERNAL MEDICINE

## 2024-11-05 PROCEDURE — 1126F AMNT PAIN NOTED NONE PRSNT: CPT | Performed by: INTERNAL MEDICINE

## 2024-11-05 PROCEDURE — 3008F BODY MASS INDEX DOCD: CPT | Performed by: INTERNAL MEDICINE

## 2024-11-05 PROCEDURE — 99213 OFFICE O/P EST LOW 20 MIN: CPT | Performed by: INTERNAL MEDICINE

## 2024-11-05 RX ORDER — BENZONATATE 100 MG/1
100 CAPSULE ORAL 3 TIMES DAILY PRN
Qty: 20 CAPSULE | Refills: 0 | Status: SHIPPED | OUTPATIENT
Start: 2024-11-05 | End: 2024-11-12

## 2024-11-06 ENCOUNTER — TELEPHONE (OUTPATIENT)
Dept: INTERNAL MEDICINE CLINIC | Facility: CLINIC | Age: 89
End: 2024-11-06

## 2024-11-06 NOTE — TELEPHONE ENCOUNTER
Patient  daughter Riddhi calling ( name and date of birth of patient verified ) on Release of Information     Patient is taking Benzonatate with some relief and today has a temp of 99.0  Suggested to use Tylenol VS. Motrin since patient takes Clopidogrel     Advised to monitor temp and call back if patient has  fevers, shortness of breath       Patient  daughter verbalizes understanding and agrees with plan.

## 2024-11-11 VITALS
WEIGHT: 164.19 LBS | TEMPERATURE: 98 F | SYSTOLIC BLOOD PRESSURE: 114 MMHG | RESPIRATION RATE: 18 BRPM | HEART RATE: 69 BPM | DIASTOLIC BLOOD PRESSURE: 70 MMHG | HEIGHT: 62 IN | BODY MASS INDEX: 30.22 KG/M2 | OXYGEN SATURATION: 98 %

## 2024-11-11 NOTE — PROGRESS NOTES
Subjective:   Patient ID: Emily Fernandez is a 89 year old female.    HPI  Patient comes in today with complaint of cough some congestion has been going on for few days no fever no chills no shortness of breath otherwise feels okay just cough is not letting her sleep too well  History/Other:   Review of Systems   Constitutional: Negative.    HENT: Negative.     Eyes: Negative.    Respiratory:  Positive for cough. Negative for shortness of breath.    Cardiovascular: Negative.  Negative for chest pain.   Gastrointestinal: Negative.    Genitourinary: Negative.    Musculoskeletal: Negative.    Skin: Negative.    Neurological: Negative.    Psychiatric/Behavioral: Negative.       Current Outpatient Medications   Medication Sig Dispense Refill    benzonatate 100 MG Oral Cap Take 1 capsule (100 mg total) by mouth 3 (three) times daily as needed for cough. 20 capsule 0    clopidogrel 75 MG Oral Tab Take 1 tablet (75 mg total) by mouth daily. 30 tablet 1    isosorbide mononitrate ER 30 MG Oral Tablet 24 Hr Take 1 tablet (30 mg total) by mouth daily. 30 tablet 1    miconazole 2 % External Powder Apply 1 each topically as needed for Itching. 3 each 3    acetaminophen 500 MG Oral Tab Take 1 tablet (500 mg total) by mouth every 6 (six) hours as needed for Pain.      docusate sodium 100 MG Oral Cap Take 1 capsule (100 mg total) by mouth 2 (two) times daily.      metoprolol succinate ER 50 MG Oral Tablet 24 Hr Take 1 tablet (50 mg total) by mouth Daily Beta Blocker. 30 tablet 0    meclizine 12.5 MG Oral Tab Take 1 tablet (12.5 mg total) by mouth 3 (three) times daily as needed for Dizziness. 20 tablet 0    apixaban 5 MG Oral Tab Take 1 tablet (5 mg total) by mouth 2 (two) times daily.      atorvastatin 40 MG Oral Tab Take 1 tablet (40 mg total) by mouth nightly.       Allergies:Allergies[1]    Objective:   Physical Exam  Vitals and nursing note reviewed.   Constitutional:       Appearance: She is well-developed.   HENT:      Head:  Normocephalic and atraumatic.      Right Ear: External ear normal.      Left Ear: External ear normal.      Nose: Nose normal.   Eyes:      Conjunctiva/sclera: Conjunctivae normal.      Pupils: Pupils are equal, round, and reactive to light.   Cardiovascular:      Rate and Rhythm: Normal rate and regular rhythm.      Heart sounds: Normal heart sounds.   Pulmonary:      Effort: Pulmonary effort is normal.      Breath sounds: Normal breath sounds.   Abdominal:      General: Bowel sounds are normal.      Palpations: Abdomen is soft.   Genitourinary:     Vagina: Normal.   Musculoskeletal:         General: Normal range of motion.      Cervical back: Normal range of motion and neck supple.   Skin:     General: Skin is warm and dry.   Neurological:      Mental Status: She is alert and oriented to person, place, and time.      Deep Tendon Reflexes: Reflexes are normal and symmetric.   Psychiatric:         Behavior: Behavior normal.         Thought Content: Thought content normal.         Judgment: Judgment normal.         Assessment & Plan:   1. Acute cough COVID test is negative we will give cough medicine take as prescribed   2. Chest congestion as above vitals okay on exam lungs clear monitor symptoms any worsening symptoms or not better needs to be seen again or go to ER       Orders Placed This Encounter   Procedures    POC COVID19 BinaxNOW Antigen       Meds This Visit:  Requested Prescriptions     Signed Prescriptions Disp Refills    benzonatate 100 MG Oral Cap 20 capsule 0     Sig: Take 1 capsule (100 mg total) by mouth 3 (three) times daily as needed for cough.       Imaging & Referrals:  None         [1]   Allergies  Allergen Reactions    Latex ITCHING    Sulfa Antibiotics ITCHING

## 2024-11-25 ENCOUNTER — MED REC SCAN ONLY (OUTPATIENT)
Dept: INTERNAL MEDICINE CLINIC | Facility: CLINIC | Age: 89
End: 2024-11-25

## 2024-12-05 ENCOUNTER — MED REC SCAN ONLY (OUTPATIENT)
Dept: INTERNAL MEDICINE CLINIC | Facility: CLINIC | Age: 89
End: 2024-12-05

## 2025-01-20 ENCOUNTER — TELEPHONE (OUTPATIENT)
Dept: INTERNAL MEDICINE CLINIC | Facility: CLINIC | Age: OVER 89
End: 2025-01-20

## 2025-02-03 ENCOUNTER — MED REC SCAN ONLY (OUTPATIENT)
Dept: INTERNAL MEDICINE CLINIC | Facility: CLINIC | Age: OVER 89
End: 2025-02-03

## 2025-02-11 ENCOUNTER — TELEPHONE (OUTPATIENT)
Dept: INTERNAL MEDICINE CLINIC | Facility: CLINIC | Age: OVER 89
End: 2025-02-11

## 2025-02-11 NOTE — TELEPHONE ENCOUNTER
Nay hh calling to request for recent progress notes.  Chart reviewed. Last office visit in October and November 2024.  Informed Nay, Patient needs to schedule physical.  Per Nay, she will contact Patient and let her know.

## 2025-02-13 ENCOUNTER — TELEPHONE (OUTPATIENT)
Dept: INTERNAL MEDICINE CLINIC | Facility: CLINIC | Age: OVER 89
End: 2025-02-13

## 2025-03-10 ENCOUNTER — HOSPITAL ENCOUNTER (EMERGENCY)
Facility: HOSPITAL | Age: OVER 89
Discharge: HOME | End: 2025-03-11
Attending: EMERGENCY MEDICINE
Payer: MEDICARE

## 2025-03-10 DIAGNOSIS — K56.41 FECAL IMPACTION (HCC): Primary | ICD-10-CM

## 2025-03-10 PROCEDURE — 99283 EMERGENCY DEPT VISIT LOW MDM: CPT

## 2025-03-10 PROCEDURE — 99282 EMERGENCY DEPT VISIT SF MDM: CPT

## 2025-03-11 ENCOUNTER — TELEPHONE (OUTPATIENT)
Dept: INTERNAL MEDICINE CLINIC | Facility: CLINIC | Age: OVER 89
End: 2025-03-11

## 2025-03-11 VITALS
TEMPERATURE: 98 F | WEIGHT: 175 LBS | SYSTOLIC BLOOD PRESSURE: 186 MMHG | RESPIRATION RATE: 22 BRPM | HEIGHT: 62 IN | HEART RATE: 65 BPM | DIASTOLIC BLOOD PRESSURE: 100 MMHG | OXYGEN SATURATION: 97 % | BODY MASS INDEX: 32.2 KG/M2

## 2025-03-11 NOTE — ED INITIAL ASSESSMENT (HPI)
Pt to ED with daughter with c/o possible fecal impaction. Pt has been complaining of rectal pain x2 days. Daughter denies n/v/d or fever.

## 2025-03-11 NOTE — ED PROVIDER NOTES
Patient Seen in: [facility] Emergency Department    History     Chief Complaint   Patient presents with    Anal Problem    Constipation       HPI    Patient presents to the ED with her 2 daughters for a suspected fecal impaction.  Patient has been complaining of rectal discomfort per daughters for 2 days and cannot go to the bathroom.  History of fecal impactions in the past that needed removal.    History reviewed.   Past Medical History:    Arrhythmia    Diabetes (HCC)    Essential hypertension    Hearing impairment    Hypokalemia    Visual impairment       History reviewed.   Past Surgical History:   Procedure Laterality Date    Hip replacement surgery      Removal gallbladder           Medications :  Prescriptions Prior to Admission[1]     Family History   Problem Relation Age of Onset    No Known Problems Father     No Known Problems Mother     Cancer Daughter        Smoking Status:   Social History     Socioeconomic History    Marital status:    Tobacco Use    Smoking status: Never    Smokeless tobacco: Never   Vaping Use    Vaping status: Never Used   Substance and Sexual Activity    Alcohol use: Never    Drug use: Never   Other Topics Concern    Grew up on a farm No    History of tanning Yes    Outdoor occupation No    Breast feeding No    Reaction to local anesthetic No    Pt has a pacemaker No    Pt has a defibrillator No       Constitutional and vital signs reviewed.      Social History and Family History elements reviewed from today, pertinent positives to the presenting problem noted.    Physical Exam     ED Triage Vitals   BP 03/10/25 2122 144/78   Pulse 03/10/25 2122 72   Resp 03/10/25 2122 22   Temp 03/10/25 2122 97.8 °F (36.6 °C)   Temp src 03/10/25 2122 Temporal   SpO2 03/10/25 2122 94 %   O2 Device 03/10/25 2245 None (Room air)       All measures to prevent infection transmission during my interaction with the patient were taken. Handwashing was performed prior to and after the  exam.  Stethoscope and any equipment used during my examination was cleaned with super sani-cloth germicidal wipes following the exam.     Physical Exam  Constitutional:       General: She is not in acute distress.     Appearance: Normal appearance. She is not ill-appearing or toxic-appearing.   Pulmonary:      Effort: Pulmonary effort is normal. No respiratory distress.   Genitourinary:     Comments: Large fecal impaction, very hard stool palpated in the rectal vault.  This was removed by myself manually.  Neurological:      Mental Status: She is alert. Mental status is at baseline.   Psychiatric:         Mood and Affect: Mood normal.         Behavior: Behavior normal.         ED Course      Labs Reviewed - No data to display    As Interpreted by me    Imaging Results Available and Reviewed while in ED: No results found.  ED Medications Administered: Medications - No data to display      MDM     Vitals:    03/10/25 2122 03/10/25 2245 03/11/25 0000   BP: 144/78 (!) 175/99 (!) 186/100   Pulse: 72 66 65   Resp: 22     Temp: 97.8 °F (36.6 °C)     TempSrc: Temporal     SpO2: 94% 96% 97%   Weight: 79.4 kg     Height: 157.5 cm (5' 2\")       *I personally reviewed and interpreted all ED vitals.    Pulse Ox: 97%, Room air, Normal     Differential Diagnosis/ Diagnostic Considerations: Fecal impaction, other    Complicating Factors: The patient already has does not have any pertinent problems on file. to contribute to the complexity of this ED evaluation.    Medical Decision Making  Patient presents to the ED with a fecal impaction.  This was removed by myself manually, no complication.  Patient feeling much better stable for discharge home with daughters.    Problems Addressed:  Fecal impaction (HCC): acute illness or injury    Amount and/or Complexity of Data Reviewed  Independent Historian:      Details: Patient's daughters provide history details    Risk  Minor surgery with identified risk factors.        Condition upon  leaving the department: Stable    Disposition and Plan     Clinical Impression:  1. Fecal impaction (HCC)        Disposition:  Discharge    Follow-up:  Ivy Horner MD  [address] [place] [id number]  [phone]    Schedule an appointment as soon as possible for a visit in 3 day(s)        Medications Prescribed:  Discharge Medication List as of 3/11/2025 12:58 AM                           [1] (Not in a hospital admission)

## 2025-03-13 ENCOUNTER — PATIENT OUTREACH (OUTPATIENT)
Dept: CASE MANAGEMENT | Age: OVER 89
End: 2025-03-13

## 2025-03-14 NOTE — PROGRESS NOTES
ED follow up.    Ivy Horner MD  Internal Medicine  [address] [address]  [phone]    Attempt #1:  Left message on voicemail for patient to call transitions specialist back to schedule follow up appointments. Provided Transitions specialist scheduling phone number [phone].  
ER discharge 3/11 [facility]     Ivy Horner MD  Internal Medicine  [address] [address]  [phone]  Unable to reach pt after 2nd attempt      Attempt #2:  Left message on voicemail for patient to call transitions specialist back to schedule follow up appointments. Provided Transitions specialist scheduling phone number [phone]. Closing encounter. Will re-open if patient returns call.     
111

## 2025-03-19 ENCOUNTER — OFFICE VISIT (OUTPATIENT)
Dept: INTERNAL MEDICINE CLINIC | Facility: CLINIC | Age: OVER 89
End: 2025-03-19

## 2025-03-19 DIAGNOSIS — Z00.00 ENCOUNTER FOR ANNUAL HEALTH EXAMINATION: ICD-10-CM

## 2025-03-19 DIAGNOSIS — E11.9 TYPE 2 DIABETES MELLITUS WITHOUT COMPLICATION, WITHOUT LONG-TERM CURRENT USE OF INSULIN (HCC): ICD-10-CM

## 2025-03-19 DIAGNOSIS — Z00.00 MEDICARE ANNUAL WELLNESS VISIT, SUBSEQUENT: Primary | ICD-10-CM

## 2025-03-19 DIAGNOSIS — N18.30 STAGE 3 CHRONIC KIDNEY DISEASE, UNSPECIFIED WHETHER STAGE 3A OR 3B CKD (HCC): ICD-10-CM

## 2025-03-19 DIAGNOSIS — I48.0 PAROXYSMAL ATRIAL FIBRILLATION (HCC): ICD-10-CM

## 2025-03-19 NOTE — PROGRESS NOTES
Subjective:   Emily Fernandez is a 89 year old female who presents for a MA AHA (Medicare Advantage Annual Health Assessment) and Subsequent Annual Wellness visit (Pt already had Initial Annual Wellness) and scheduled follow up of multiple significant but stable problems.       History/Other:   Fall Risk Assessment:   She has been screened for Falls and is High Risk. Fall Prevention information provided to patient in After Visit Summary.    Do you feel unsteady when standing or walking?: Yes  Do you worry about falling?: Yes  Have you fallen in the past year?: No     Cognitive Assessment:   Abnormal  What day of the week is this?: Incorrect  What month is it?: Correct  What year is it?: Correct  Recall \"Ball\": Correct  Recall \"Flag\": Incorrect  Recall \"Tree\": Correct    Functional Ability/Status:   Emily Fernandez has some abnormal functions as listed below:  She has Dressing and/or Bathing issues based on screening of functional status.  Difficulty dressing or bathing?: Yes  Bathing or Showering: Need some help  Dressing: Need some help  She has Toileting difficulties based on screening of functional status.She has Driving difficulties based on screening of functional status. She has Meal Preparation difficulties based on screening of functional status.She has difficulties Managing Money/Bills based on screening of functional status.She has difficulties Shopping for Groceries based on screening of functional status. She has difficulties Taking Meds as Rx'd based on screening of functional status. She has Hearing problems based on screening of functional status.She has Vision problems based on screening of functional status. She has Walking problems based on screening of functional status. She has problems with Daily Activities based on screening of functional status.       Depression Screening (PHQ):  PHQ-2 SCORE: 0  , done 3/19/2025   Last [facility] Suicide Screening on 3/19/2025 was No Risk.     5 minutes spent  screening and counseling for depression    Advanced Directives:   She does have a Living Will but we do NOT have it on file in Epic.    She does have a POA but we do NOT have it on file in Epic.    Discussed Advance Care Planning with patient (and family/surrogate if present). Standard forms made available to patient in After Visit Summary.      Patient Active Problem List   Diagnosis    Syncope and collapse    Injury of head, initial encounter    Vertigo    Aortic valve stenosis    Atrial fibrillation with rapid ventricular response ([facility])    Histoplasma capsulatum with retinitis    Legal blindness    Stage 2 chronic kidney disease    Type 2 diabetes mellitus ([facility])    Cerebrovascular accident (HCC)    Essential hypertension, benign    Stercoral colitis    Impacted stool in rectum ([facility])    Thrombocytopenia    Chest pain, unspecified type    Elevated troponin    CKD (chronic kidney disease) stage 3, GFR 30-59 ml/min ([facility])     Allergies:  She is allergic to latex and sulfa antibiotics.    Current Medications:  Outpatient Medications Marked as Taking for the 3/19/25 encounter (Office Visit) with Iyv Horner MD   Medication Sig    clopidogrel 75 MG Oral Tab Take 1 tablet (75 mg total) by mouth daily.    isosorbide mononitrate ER 30 MG Oral Tablet 24 Hr Take 1 tablet (30 mg total) by mouth daily.    miconazole 2 % External Powder Apply 1 each topically as needed for Itching.    acetaminophen 500 MG Oral Tab Take 1 tablet (500 mg total) by mouth every 6 (six) hours as needed for Pain.    docusate sodium 100 MG Oral Cap Take 1 capsule (100 mg total) by mouth 2 (two) times daily.    metoprolol succinate ER 50 MG Oral Tablet 24 Hr Take 1 tablet (50 mg total) by mouth Daily Beta Blocker.    meclizine 12.5 MG Oral Tab Take 1 tablet (12.5 mg total) by mouth 3 (three) times daily as needed for Dizziness.    apixaban 5 MG Oral Tab Take 1 tablet (5 mg total) by mouth 2 (two) times daily.    atorvastatin 40 MG Oral Tab Take 1  tablet (40 mg total) by mouth nightly.       Medical History:  She  has a past medical history of Arrhythmia, Diabetes (HCC), Essential hypertension, Hearing impairment, Hypokalemia, and Visual impairment.  Surgical History:  She  has a past surgical history that includes hip replacement surgery and removal gallbladder.   Family History:  Her family history includes Cancer in her daughter; No Known Problems in her father and mother.  Social History:  She  reports that she has never smoked. She has never used smokeless tobacco. She reports that she does not drink alcohol and does not use drugs.    Tobacco:  She has never smoked tobacco.    CAGE Alcohol Screen:   CAGE screening score of 0 on 3/19/2025, showing low risk of alcohol abuse.      Patient Care Team:  Ivy Horner MD as PCP - General (Internal Medicine)    Review of Systems  GENERAL: feels well otherwise  SKIN: denies any unusual skin lesions  EYES: denies blurred vision or double vision  HEENT: denies nasal congestion, sinus pain or ST  LUNGS: denies shortness of breath with exertion  CARDIOVASCULAR: denies chest pain on exertion  GI: denies abdominal pain, denies heartburn  : denies dysuria, vaginal discharge or itching, no complaint of urinary incontinence   MUSCULOSKELETAL: denies back pain  NEURO: denies headaches  PSYCHE: denies depression or anxiety  HEMATOLOGIC: denies hx of anemia  ENDOCRINE: denies thyroid history  ALL/ASTHMA: denies hx of allergy or asthma    Objective:   Physical Exam  General Appearance:  Alert, cooperative, no distress, appears stated age   Head:  Normocephalic, without obvious abnormality, atraumatic   Eyes:  PERRL, conjunctiva/corneas clear, EOM's intact both eyes   Ears:  Normal TM's and external ear canals, both ears   Nose: Nares normal, septum midline,mucosa normal, no drainage or sinus tenderness   Throat: Lips, mucosa, and tongue normal; teeth and gums normal   Neck: Supple, symmetrical, trachea midline, no  adenopathy;  thyroid: not enlarged, symmetric, no tenderness/mass/nodules; no carotid bruit or JVD   Back:   Symmetric, no curvature, ROM normal, no CVA tenderness   Lungs:   Clear to auscultation bilaterally, respirations unlabored   Heart:  Regular rate and rhythm, S1 and S2 normal, no murmur, rub, or gallop   Abdomen:   Soft, non-tender, bowel sounds active all four quadrants,  no masses, no organomegaly   Pelvic: Deferred   Extremities: Extremities normal, atraumatic, no cyanosis or edema   Pulses: 2+ and symmetric   Skin: Skin color, texture, turgor normal, no rashes or lesions   Lymph nodes: Cervical, supraclavicular, and axillary nodes normal   Neurologic: Normal       /85   Pulse 80   Temp (!) 96.3 °F (35.7 °C) (Temporal)   Ht 5' 2\" (1.575 m)   Wt 168 lb (76.2 kg)   SpO2 99%   BMI 30.73 kg/m²  Estimated body mass index is 30.73 kg/m² as calculated from the following:    Height as of this encounter: 5' 2\" (1.575 m).    Weight as of this encounter: 168 lb (76.2 kg).    Medicare Hearing Assessment:   Hearing Screening    Time taken: 3/19/2025  2:56 PM  Entry User: Seble Simpson CMA  Screening Method: Finger Rub  Finger Rub Result: Fail               Assessment & Plan:   Emily Fernandez is a 89 year old female who presents for a Medicare Assessment.     1. Encounter for annual health examination (Primary)  -     CBC With Differential With Platelet; Future; Expected date: 03/19/2025  -     Comp Metabolic Panel (14); Future; Expected date: 03/19/2025  -     TSH W Reflex To Free T4; Future; Expected date: 03/19/2025  -     Lipid Panel; Future; Expected date: 03/19/2025  2. Type 2 diabetes mellitus without complication, without long-term current use of insulin ([facility]) will check hba1c   -     Microalb/Creat Ratio, Random Urine; Future; Expected date: 03/19/2025  -     Comp Metabolic Panel (14); Future; Expected date: 03/19/2025  -     Hemoglobin A1C; Future; Expected date: 03/19/2025  3. Paroxysmal atrial  fibrillation (HCC),stable   4. Stage 3 chronic kidney disease, unspecified whether stage 3a or 3b CKD (HCC)stable, will continue to monitor  5 h/o cva - stable   6 histoplasma capsulatum with retinitis   The patient indicates understanding of these issues and agrees to the plan.  Reinforced healthy diet, lifestyle, and exercise.      No follow-ups on file.     TERRY WRIGHT MD, 3/19/2025     Supplementary Documentation:   General Health:  In the past six months, have you lost more than 10 pounds without trying?: 2 - No  Has your appetite been poor?: No  Type of Diet: Balanced  How does the patient maintain a good energy level?: Daily Walks  How would you describe your daily physical activity?: Light  How would you describe your current health state?: Good  How do you maintain positive mental well-being?:  (watching tv)  On a scale of 0 to 10, with 0 being no pain and 10 being severe pain, what is your pain level?: 0 - (None)  In the past six months, have you experienced urine leakage?: 1-Yes  At any time do you feel concerned for the safety/well-being of yourself and/or your children, in your home or elsewhere?: No  Have you had any immunizations at another office such as Influenza, Hepatitis B, Tetanus, or Pneumococcal?: No    Health Maintenance   Topic Date Due    Diabetes Care Dilated Eye Exam  Never done    Pneumococcal Vaccine: 50+ Years (1 of 2 - PCV) Never done    COVID-19 Vaccine (3 - 2024-25 season) 09/01/2024    Zoster Vaccines (2 of 2) 09/02/2024    Annual Well Visit  01/01/2025    Diabetes Care: Foot Exam (Annual)  01/01/2025    Diabetes Care: Microalb/Creat Ratio (Annual)  Never done    Influenza Vaccine (1) 06/30/2025 (Originally 10/1/2024)    HTN: BP Follow-Up  04/19/2025    Diabetes Care A1C  04/23/2025    Diabetes Care: GFR  10/11/2025    Annual Depression Screening  Completed    Fall Risk Screening (Annual)  Completed    Meningococcal B Vaccine  Aged Out

## 2025-03-20 VITALS
BODY MASS INDEX: 30.91 KG/M2 | OXYGEN SATURATION: 99 % | SYSTOLIC BLOOD PRESSURE: 135 MMHG | TEMPERATURE: 96 F | HEART RATE: 80 BPM | DIASTOLIC BLOOD PRESSURE: 85 MMHG | WEIGHT: 168 LBS | HEIGHT: 62 IN

## 2025-03-20 PROBLEM — S09.90XA INJURY OF HEAD, INITIAL ENCOUNTER: Status: RESOLVED | Noted: 2023-02-21 | Resolved: 2025-03-20

## 2025-03-20 PROBLEM — R79.89 ELEVATED TROPONIN: Status: RESOLVED | Noted: 2024-10-09 | Resolved: 2025-03-20

## 2025-03-20 PROBLEM — E11.9 TYPE 2 DIABETES MELLITUS (HCC): Status: RESOLVED | Noted: 2023-01-09 | Resolved: 2025-03-20

## 2025-03-20 PROBLEM — R55 SYNCOPE AND COLLAPSE: Status: RESOLVED | Noted: 2023-02-21 | Resolved: 2025-03-20

## 2025-03-20 PROBLEM — N18.2 STAGE 2 CHRONIC KIDNEY DISEASE: Status: RESOLVED | Noted: 2023-01-04 | Resolved: 2025-03-20

## 2025-03-20 PROBLEM — K56.41 IMPACTED STOOL IN RECTUM (HCC): Status: RESOLVED | Noted: 2023-10-07 | Resolved: 2025-03-20

## 2025-03-20 PROBLEM — K52.89 STERCORAL COLITIS: Status: RESOLVED | Noted: 2023-10-07 | Resolved: 2025-03-20

## 2025-03-20 PROBLEM — R07.9 CHEST PAIN, UNSPECIFIED TYPE: Status: RESOLVED | Noted: 2024-10-09 | Resolved: 2025-03-20

## 2025-04-18 ENCOUNTER — TELEPHONE (OUTPATIENT)
Dept: INTERNAL MEDICINE CLINIC | Facility: CLINIC | Age: OVER 89
End: 2025-04-18

## 2025-04-18 NOTE — TELEPHONE ENCOUNTER
Nay is asking for the visit notes from the patient's last appointment on 3/19/25 to be faxed to her, please.    Fax   Attn: Nay Coulter

## 2025-04-22 ENCOUNTER — TELEPHONE (OUTPATIENT)
Dept: INTERNAL MEDICINE CLINIC | Facility: CLINIC | Age: OVER 89
End: 2025-04-22

## 2025-04-22 NOTE — TELEPHONE ENCOUNTER
Nay, is calling for the status of the form she faxed last week. Please, confirm that the form was received and will be filled out and faxed back. Please,  fax back to [phone].

## 2025-04-24 NOTE — TELEPHONE ENCOUNTER
Nay/[facility] [phone] is calling for the status of the face to face form. Per Nay she faxed it last week. Please confirm that the form was received and will be faxed back to [phone].

## 2025-05-01 ENCOUNTER — MED REC SCAN ONLY (OUTPATIENT)
Dept: INTERNAL MEDICINE CLINIC | Facility: CLINIC | Age: OVER 89
End: 2025-05-01

## 2025-05-09 ENCOUNTER — TELEPHONE (OUTPATIENT)
Dept: INTERNAL MEDICINE CLINIC | Facility: CLINIC | Age: OVER 89
End: 2025-05-09

## 2025-05-09 NOTE — TELEPHONE ENCOUNTER
Nay from [facility] called and said they received a form but it is empty, needing face to face visit to be filled and completed.         FAX: [phone]

## 2025-05-20 ENCOUNTER — TELEPHONE (OUTPATIENT)
Dept: OTOLARYNGOLOGY | Facility: CLINIC | Age: OVER 89
End: 2025-05-20

## 2025-06-03 ENCOUNTER — MED REC SCAN ONLY (OUTPATIENT)
Dept: INTERNAL MEDICINE CLINIC | Facility: CLINIC | Age: OVER 89
End: 2025-06-03

## (undated) NOTE — LETTER
3/11/2025              Emily Fernandez        [address] [facility] [address]    This is the office of Dr. Andrzej Horner.    Thank you for putting your trust in [facility].  Our goal is to deliver the highest quality healthcare and an exceptional patient experience. Upon reviewing of your medical record shows you are due for the following:       Annual Medicare Physical       Please call [phone] to schedule your appointment or schedule online via [facility].     If you changed to a new provider at another facility, please notify the clinic to update your records.    If you had any recent testing at another facility, please have your results faxed to our office at [phone].     Thank you and have a great day!

## (undated) NOTE — LETTER
[facility]  [address], [place] [address]  Phone: [phone]  Fax: [phone]   1/23/2024              Emily Rebecca        [address]        [facility] [address]    Hello,     This is the office of Dr. Andrzej Horner       Thank you for putting your trust in [facility].  Our goal is to deliver the highest quality healthcare and an exceptional patient experience. Upon reviewing of your medical record shows you are due for the following:       Annual Medicare Physical       Please call [phone] to schedule your appointment or schedule online via [employer].     If you changed to a new provider at another facility, please notify the clinic to update your records.    If you had any recent testing at another facility, please have your results faxed to our office at [phone].     Thank you and have a great day!

## (undated) NOTE — LETTER
[place] [address]  Authorization for Invasive Procedures  Date: 10/9/2024           Time: 12:27 PM     I hereby authorize Roberto Glover my physician and his/her assistants (if applicable), which may include medical students, residents, and/or fellows, to perform the following surgical operation/ procedure and administer such anesthesia as may be determined necessary by my physician:  Operation/Procedure name (s)  Cardiac Catheterization, Left Ventricular Cineangiography, Bilateral Selective Coronary Angiography and/or Right Heart Catheterization; possible Percutaneous Transluminal Coronary Angioplasty, Coronary Atherectomy, Coronary Stent, Intracoronary Thrombolytic therapy, Antiplatelet therapy and/or Intravascular Ultrasound on Emily Alanizjill   2.   I recognize that during the surgical operation/procedure, unforeseen conditions may necessitate additional or different procedures than those listed above.  I, therefore, further authorize and request that the above-named surgeon, assistants, or designees perform such procedures as are, in their judgment, necessary and desirable.    3.   My surgeon/physician has discussed prior to my surgery the potential benefits, risks and side effects of this procedure; the likelihood of achieving goals; and potential problems that might occur during recuperation.  They also discussed reasonable alternatives to the procedure, including risks, benefits, and side effects related to the alternatives and risks related to not receiving this procedure.  I have had all my questions answered and I acknowledge that no guarantee has been made as to the result that may be obtained.    4.   Should the need arise during my operation/procedure, which includes change of level of care prior to discharge, I also consent to the administration of blood and/or blood products.  Further, I understand that despite careful testing and screening of blood or blood products by  collecting agencies, I may still be subject to ill effects as a result of receiving a blood transfusion and/or blood products.  The following are some, but not all, of the potential risks that can occur: fever and allergic reactions, hemolytic reactions, transmission of diseases such as Hepatitis, AIDS and Cytomegalovirus (CMV) and fluid overload.  In the event that I wish to have an autologous transfusion of my own blood, or a directed donor transfusion, I will discuss this with my physician.  Check only if Refusing Blood or Blood Products  I understand refusal of blood or blood products as deemed necessary by my physician may have serious consequences to my condition to include possible death. I hereby assume responsibility for my refusal and release the hospital, its personnel, and my physicians from any responsibility for the consequences of my refusal.          o  Refuse      5.   I authorize the use of any specimen, organs, tissues, body parts or foreign objects that may be removed from my body during the operation/procedure for diagnosis, research or teaching purposes and their subsequent disposal by hospital authorities.  I also authorize the release of specimen test results and/or written reports to my treating physician on the hospital medical staff or other referring or consulting physicians involved in my care, at the discretion of the Pathologist or my treating physician.    6.   I consent to the photographing or videotaping of the operations or procedures to be performed, including appropriate portions of my body for medical, scientific, or educational purposes, provided my identity is not revealed by the pictures or by descriptive texts accompanying them.  If the procedure has been photographed/videotaped, the surgeon will obtain the original picture, image, videotape or CD.  The hospital will not be responsible for storage, release or maintenance of the picture, image, tape or CD.    7.   I consent  to the presence of a  or observers in the operating room as deemed necessary by my physician or their designees.    8.   I recognize that in the event my procedure results in extended X-Ray/fluoroscopy time, I may develop a skin reaction.    9. If I have a Do Not Attempt Resuscitation (DNAR) order in place, that status will be suspended while in the operating room, procedural suite, and during the recovery period unless otherwise explicitly stated by me (or a person authorized to consent on my behalf). The surgeon or my attending physician will determine when the applicable recovery period ends for purposes of reinstating the DNAR order.  10. Patients having a sterilization procedure: I understand that if the procedure is successful the results will be permanent and it will therefore be impossible for me to inseminate, conceive, or bear children.  I also understand that the procedure is intended to result in sterility, although the result has not been guaranteed.   11. I acknowledge that my physician has explained sedation/analgesia administration to me including the risk and benefits I consent to the administration of sedation/analgesia as may be necessary or desirable in the judgment of my physician.    I CERTIFY THAT I HAVE READ AND FULLY UNDERSTAND THE ABOVE CONSENT TO OPERATION and/or OTHER PROCEDURE.        ____________________________________       _________________________________      ______________________________  Signature of Patient         Signature of Responsible Person        Printed Name of Responsible Person    ____________________________________      _________________________________      ______________________________       Signature of Witness          Relationship to Patient                       Date                                       Time  Patient Name: Emily Batresdanisha  : 1932    Reviewed: 2024   Printed: 2024  Medical Record #: [id number] Page 1  of 2           STATEMENT OF PHYSICIAN My signature below affirms that prior to the time of the procedure; I have explained to the patient and/or his/her legal representative, the risks and benefits involved in the proposed treatment and any reasonable alternative to the proposed treatment. I have also explained the risks and benefits involved in refusal of the proposed treatment and alternatives to the proposed treatment and have answered the patient's questions. If I have a significant financial interest in a co-management agreement or a significant financial interest in any product or implant, or other significant relationship used in this procedure/surgery, I have disclosed this and had a discussion with my patient.     _______________________________________________________________ _____________________________  (Signature of Physician)                                                                                         (Date)                                   (Time)  Patient Name: Emily Fernandez  : 1932    Reviewed: 2024   Printed: 2024  Medical Record #: [id number] Page 2 of 2